# Patient Record
Sex: FEMALE | Race: WHITE | NOT HISPANIC OR LATINO | Employment: OTHER | URBAN - METROPOLITAN AREA
[De-identification: names, ages, dates, MRNs, and addresses within clinical notes are randomized per-mention and may not be internally consistent; named-entity substitution may affect disease eponyms.]

---

## 2019-01-31 ENCOUNTER — RECORDS - HEALTHEAST (OUTPATIENT)
Dept: LAB | Facility: CLINIC | Age: 64
End: 2019-01-31

## 2019-01-31 LAB
CHOLEST SERPL-MCNC: 216 MG/DL
FASTING STATUS PATIENT QL REPORTED: ABNORMAL
HDLC SERPL-MCNC: 61 MG/DL
LDLC SERPL CALC-MCNC: 138 MG/DL
TRIGL SERPL-MCNC: 85 MG/DL

## 2019-02-01 LAB — 25(OH)D3 SERPL-MCNC: 32.5 NG/ML (ref 30–80)

## 2019-02-19 ENCOUNTER — HOSPITAL ENCOUNTER (OUTPATIENT)
Dept: MAMMOGRAPHY | Facility: CLINIC | Age: 64
Discharge: HOME | End: 2019-02-19
Attending: FAMILY MEDICINE

## 2019-02-19 DIAGNOSIS — Z12.31 VISIT FOR SCREENING MAMMOGRAM: ICD-10-CM

## 2019-03-21 ENCOUNTER — RECORDS - HEALTHEAST (OUTPATIENT)
Dept: LAB | Facility: CLINIC | Age: 64
End: 2019-03-21

## 2019-03-21 LAB — TSH SERPL DL<=0.005 MIU/L-ACNC: 2.87 UIU/ML (ref 0.3–5)

## 2021-05-20 ENCOUNTER — RECORDS - HEALTHEAST (OUTPATIENT)
Dept: LAB | Facility: CLINIC | Age: 66
End: 2021-05-20

## 2021-05-20 LAB
ALBUMIN SERPL-MCNC: 4 G/DL (ref 3.5–5)
ALP SERPL-CCNC: 99 U/L (ref 45–120)
ALT SERPL W P-5'-P-CCNC: 21 U/L (ref 0–45)
ANION GAP SERPL CALCULATED.3IONS-SCNC: 11 MMOL/L (ref 5–18)
AST SERPL W P-5'-P-CCNC: 24 U/L (ref 0–40)
BILIRUB SERPL-MCNC: 0.7 MG/DL (ref 0–1)
BUN SERPL-MCNC: 18 MG/DL (ref 8–22)
CALCIUM SERPL-MCNC: 9 MG/DL (ref 8.5–10.5)
CHLORIDE BLD-SCNC: 104 MMOL/L (ref 98–107)
CHOLEST SERPL-MCNC: 185 MG/DL
CO2 SERPL-SCNC: 26 MMOL/L (ref 22–31)
CREAT SERPL-MCNC: 0.68 MG/DL (ref 0.6–1.1)
FASTING STATUS PATIENT QL REPORTED: NORMAL
GFR SERPL CREATININE-BSD FRML MDRD: >60 ML/MIN/1.73M2
GLUCOSE BLD-MCNC: 85 MG/DL (ref 70–125)
HDLC SERPL-MCNC: 52 MG/DL
LDLC SERPL CALC-MCNC: 117 MG/DL
POTASSIUM BLD-SCNC: 4.4 MMOL/L (ref 3.5–5)
PROT SERPL-MCNC: 6.3 G/DL (ref 6–8)
SODIUM SERPL-SCNC: 141 MMOL/L (ref 136–145)
TRIGL SERPL-MCNC: 82 MG/DL
TSH SERPL DL<=0.005 MIU/L-ACNC: 1.97 UIU/ML (ref 0.3–5)

## 2021-05-21 LAB — 25(OH)D3 SERPL-MCNC: 32.4 NG/ML (ref 30–80)

## 2021-05-27 ENCOUNTER — RECORDS - HEALTHEAST (OUTPATIENT)
Dept: ADMINISTRATIVE | Facility: CLINIC | Age: 66
End: 2021-05-27

## 2021-05-28 ENCOUNTER — RECORDS - HEALTHEAST (OUTPATIENT)
Dept: ADMINISTRATIVE | Facility: CLINIC | Age: 66
End: 2021-05-28

## 2021-06-02 ENCOUNTER — RECORDS - HEALTHEAST (OUTPATIENT)
Dept: ADMINISTRATIVE | Facility: CLINIC | Age: 66
End: 2021-06-02

## 2021-06-19 ENCOUNTER — HEALTH MAINTENANCE LETTER (OUTPATIENT)
Age: 66
End: 2021-06-19

## 2021-07-13 ENCOUNTER — RECORDS - HEALTHEAST (OUTPATIENT)
Dept: ADMINISTRATIVE | Facility: CLINIC | Age: 66
End: 2021-07-13

## 2021-07-21 ENCOUNTER — RECORDS - HEALTHEAST (OUTPATIENT)
Dept: ADMINISTRATIVE | Facility: CLINIC | Age: 66
End: 2021-07-21

## 2021-09-29 ENCOUNTER — LAB REQUISITION (OUTPATIENT)
Dept: LAB | Facility: CLINIC | Age: 66
End: 2021-09-29

## 2021-09-29 ENCOUNTER — LAB REQUISITION (OUTPATIENT)
Dept: LAB | Facility: CLINIC | Age: 66
End: 2021-09-29
Payer: COMMERCIAL

## 2021-09-29 DIAGNOSIS — A31.0 PULMONARY MYCOBACTERIAL INFECTION (H): ICD-10-CM

## 2021-09-29 DIAGNOSIS — Z03.818 ENCOUNTER FOR OBSERVATION FOR SUSPECTED EXPOSURE TO OTHER BIOLOGICAL AGENTS RULED OUT: ICD-10-CM

## 2021-09-29 LAB
C REACTIVE PROTEIN LHE: 1 MG/DL (ref 0–0.8)
ERYTHROCYTE [DISTWIDTH] IN BLOOD BY AUTOMATED COUNT: 12.4 % (ref 10–15)
ERYTHROCYTE [SEDIMENTATION RATE] IN BLOOD BY WESTERGREN METHOD: 2 MM/HR (ref 0–20)
HCT VFR BLD AUTO: 43 % (ref 35–47)
HGB BLD-MCNC: 13.8 G/DL (ref 11.7–15.7)
MCH RBC QN AUTO: 28.6 PG (ref 26.5–33)
MCHC RBC AUTO-ENTMCNC: 32.1 G/DL (ref 31.5–36.5)
MCV RBC AUTO: 89 FL (ref 78–100)
PLATELET # BLD AUTO: 264 10E3/UL (ref 150–450)
RBC # BLD AUTO: 4.82 10E6/UL (ref 3.8–5.2)
WBC # BLD AUTO: 5.5 10E3/UL (ref 4–11)

## 2021-09-29 PROCEDURE — 85027 COMPLETE CBC AUTOMATED: CPT | Performed by: FAMILY MEDICINE

## 2021-09-29 PROCEDURE — U0003 INFECTIOUS AGENT DETECTION BY NUCLEIC ACID (DNA OR RNA); SEVERE ACUTE RESPIRATORY SYNDROME CORONAVIRUS 2 (SARS-COV-2) (CORONAVIRUS DISEASE [COVID-19]), AMPLIFIED PROBE TECHNIQUE, MAKING USE OF HIGH THROUGHPUT TECHNOLOGIES AS DESCRIBED BY CMS-2020-01-R: HCPCS | Mod: ORL | Performed by: FAMILY MEDICINE

## 2021-09-29 PROCEDURE — 36415 COLL VENOUS BLD VENIPUNCTURE: CPT | Performed by: FAMILY MEDICINE

## 2021-09-29 PROCEDURE — 86141 C-REACTIVE PROTEIN HS: CPT | Performed by: FAMILY MEDICINE

## 2021-09-29 PROCEDURE — 85652 RBC SED RATE AUTOMATED: CPT | Performed by: FAMILY MEDICINE

## 2021-09-30 LAB — SARS-COV-2 RNA RESP QL NAA+PROBE: NEGATIVE

## 2021-10-13 ENCOUNTER — LAB REQUISITION (OUTPATIENT)
Dept: LAB | Facility: CLINIC | Age: 66
End: 2021-10-13
Payer: COMMERCIAL

## 2021-10-13 DIAGNOSIS — Z03.818 ENCOUNTER FOR OBSERVATION FOR SUSPECTED EXPOSURE TO OTHER BIOLOGICAL AGENTS RULED OUT: ICD-10-CM

## 2021-10-13 PROCEDURE — U0003 INFECTIOUS AGENT DETECTION BY NUCLEIC ACID (DNA OR RNA); SEVERE ACUTE RESPIRATORY SYNDROME CORONAVIRUS 2 (SARS-COV-2) (CORONAVIRUS DISEASE [COVID-19]), AMPLIFIED PROBE TECHNIQUE, MAKING USE OF HIGH THROUGHPUT TECHNOLOGIES AS DESCRIBED BY CMS-2020-01-R: HCPCS | Mod: ORL | Performed by: FAMILY MEDICINE

## 2021-10-14 LAB — SARS-COV-2 RNA RESP QL NAA+PROBE: NEGATIVE

## 2021-11-02 ENCOUNTER — ANCILLARY PROCEDURE (OUTPATIENT)
Dept: MAMMOGRAPHY | Facility: CLINIC | Age: 66
End: 2021-11-02
Attending: FAMILY MEDICINE
Payer: COMMERCIAL

## 2021-11-02 DIAGNOSIS — Z12.31 VISIT FOR SCREENING MAMMOGRAM: ICD-10-CM

## 2021-11-02 PROCEDURE — 77067 SCR MAMMO BI INCL CAD: CPT

## 2022-01-18 ENCOUNTER — LAB REQUISITION (OUTPATIENT)
Dept: LAB | Facility: CLINIC | Age: 67
End: 2022-01-18
Payer: COMMERCIAL

## 2022-01-18 DIAGNOSIS — Z03.818 ENCOUNTER FOR OBSERVATION FOR SUSPECTED EXPOSURE TO OTHER BIOLOGICAL AGENTS RULED OUT: ICD-10-CM

## 2022-01-18 PROCEDURE — U0003 INFECTIOUS AGENT DETECTION BY NUCLEIC ACID (DNA OR RNA); SEVERE ACUTE RESPIRATORY SYNDROME CORONAVIRUS 2 (SARS-COV-2) (CORONAVIRUS DISEASE [COVID-19]), AMPLIFIED PROBE TECHNIQUE, MAKING USE OF HIGH THROUGHPUT TECHNOLOGIES AS DESCRIBED BY CMS-2020-01-R: HCPCS | Performed by: NURSE PRACTITIONER

## 2022-01-19 LAB — SARS-COV-2 RNA RESP QL NAA+PROBE: POSITIVE

## 2022-02-03 ENCOUNTER — LAB REQUISITION (OUTPATIENT)
Dept: LAB | Facility: CLINIC | Age: 67
End: 2022-02-03

## 2022-02-03 DIAGNOSIS — J02.9 ACUTE PHARYNGITIS, UNSPECIFIED: ICD-10-CM

## 2022-02-03 PROCEDURE — 87081 CULTURE SCREEN ONLY: CPT | Performed by: NURSE PRACTITIONER

## 2022-02-05 LAB — BACTERIA SPEC CULT: NORMAL

## 2022-07-10 ENCOUNTER — HEALTH MAINTENANCE LETTER (OUTPATIENT)
Age: 67
End: 2022-07-10

## 2022-08-05 ENCOUNTER — TRANSFERRED RECORDS (OUTPATIENT)
Dept: HEALTH INFORMATION MANAGEMENT | Facility: CLINIC | Age: 67
End: 2022-08-05

## 2022-09-11 ENCOUNTER — HEALTH MAINTENANCE LETTER (OUTPATIENT)
Age: 67
End: 2022-09-11

## 2022-10-05 ENCOUNTER — LAB REQUISITION (OUTPATIENT)
Dept: LAB | Facility: CLINIC | Age: 67
End: 2022-10-05
Payer: COMMERCIAL

## 2022-10-05 DIAGNOSIS — J01.90 ACUTE SINUSITIS, UNSPECIFIED: ICD-10-CM

## 2022-10-05 PROCEDURE — U0003 INFECTIOUS AGENT DETECTION BY NUCLEIC ACID (DNA OR RNA); SEVERE ACUTE RESPIRATORY SYNDROME CORONAVIRUS 2 (SARS-COV-2) (CORONAVIRUS DISEASE [COVID-19]), AMPLIFIED PROBE TECHNIQUE, MAKING USE OF HIGH THROUGHPUT TECHNOLOGIES AS DESCRIBED BY CMS-2020-01-R: HCPCS | Mod: ORL | Performed by: PHYSICIAN ASSISTANT

## 2022-10-06 LAB — SARS-COV-2 RNA RESP QL NAA+PROBE: NEGATIVE

## 2023-04-10 ENCOUNTER — LAB REQUISITION (OUTPATIENT)
Dept: LAB | Facility: CLINIC | Age: 68
End: 2023-04-10

## 2023-04-10 DIAGNOSIS — Z13.220 ENCOUNTER FOR SCREENING FOR LIPOID DISORDERS: ICD-10-CM

## 2023-04-10 DIAGNOSIS — M85.80 OTHER SPECIFIED DISORDERS OF BONE DENSITY AND STRUCTURE, UNSPECIFIED SITE: ICD-10-CM

## 2023-04-10 LAB
ALBUMIN SERPL BCG-MCNC: 4.2 G/DL (ref 3.5–5.2)
ALP SERPL-CCNC: 96 U/L (ref 35–104)
ALT SERPL W P-5'-P-CCNC: 23 U/L (ref 10–35)
ANION GAP SERPL CALCULATED.3IONS-SCNC: 13 MMOL/L (ref 7–15)
AST SERPL W P-5'-P-CCNC: 28 U/L (ref 10–35)
BILIRUB SERPL-MCNC: 0.6 MG/DL
BUN SERPL-MCNC: 18.4 MG/DL (ref 8–23)
CALCIUM SERPL-MCNC: 9.2 MG/DL (ref 8.8–10.2)
CHLORIDE SERPL-SCNC: 108 MMOL/L (ref 98–107)
CHOLEST SERPL-MCNC: 172 MG/DL
CREAT SERPL-MCNC: 0.84 MG/DL (ref 0.51–0.95)
DEPRECATED HCO3 PLAS-SCNC: 23 MMOL/L (ref 22–29)
GFR SERPL CREATININE-BSD FRML MDRD: 74 ML/MIN/1.73M2
GLUCOSE SERPL-MCNC: 85 MG/DL (ref 70–99)
HDLC SERPL-MCNC: 53 MG/DL
LDLC SERPL CALC-MCNC: 106 MG/DL
NONHDLC SERPL-MCNC: 119 MG/DL
POTASSIUM SERPL-SCNC: 4.6 MMOL/L (ref 3.4–5.3)
PROT SERPL-MCNC: 6.1 G/DL (ref 6.4–8.3)
SODIUM SERPL-SCNC: 144 MMOL/L (ref 136–145)
TRIGL SERPL-MCNC: 65 MG/DL

## 2023-04-10 PROCEDURE — 82306 VITAMIN D 25 HYDROXY: CPT | Performed by: FAMILY MEDICINE

## 2023-04-10 PROCEDURE — 80061 LIPID PANEL: CPT | Performed by: FAMILY MEDICINE

## 2023-04-10 PROCEDURE — 80053 COMPREHEN METABOLIC PANEL: CPT | Performed by: FAMILY MEDICINE

## 2023-04-11 LAB — DEPRECATED CALCIDIOL+CALCIFEROL SERPL-MC: 40 UG/L (ref 20–75)

## 2023-07-17 ENCOUNTER — TRANSFERRED RECORDS (OUTPATIENT)
Dept: HEALTH INFORMATION MANAGEMENT | Facility: CLINIC | Age: 68
End: 2023-07-17
Payer: COMMERCIAL

## 2023-07-19 ENCOUNTER — MEDICAL CORRESPONDENCE (OUTPATIENT)
Dept: HEALTH INFORMATION MANAGEMENT | Facility: CLINIC | Age: 68
End: 2023-07-19
Payer: COMMERCIAL

## 2023-07-20 ENCOUNTER — TELEPHONE (OUTPATIENT)
Dept: PULMONOLOGY | Facility: CLINIC | Age: 68
End: 2023-07-20

## 2023-07-24 DIAGNOSIS — J47.9 BRONCHIECTASIS (H): Primary | ICD-10-CM

## 2023-07-29 ENCOUNTER — HEALTH MAINTENANCE LETTER (OUTPATIENT)
Age: 68
End: 2023-07-29

## 2023-08-18 ENCOUNTER — TELEPHONE (OUTPATIENT)
Dept: PULMONOLOGY | Facility: CLINIC | Age: 68
End: 2023-08-18
Payer: COMMERCIAL

## 2023-08-18 NOTE — TELEPHONE ENCOUNTER
Called pt back, we do have access to [place] which is the records she is requesting.    Leona Weiss LPN

## 2023-08-18 NOTE — TELEPHONE ENCOUNTER
[facility] Call Center    Phone Message    May a detailed message be left on voicemail: yes     Reason for Call: Other: Medical Records   Patient would like a call back from the team to discuss if they've received all the needed medical records before her appt with the provider. Please review and advise. Thank you.     Action Taken: Other: Pulm    Travel Screening: Not Applicable

## 2023-08-28 ENCOUNTER — TRANSFERRED RECORDS (OUTPATIENT)
Dept: HEALTH INFORMATION MANAGEMENT | Facility: CLINIC | Age: 68
End: 2023-08-28
Payer: COMMERCIAL

## 2023-09-19 ENCOUNTER — OFFICE VISIT (OUTPATIENT)
Dept: PULMONOLOGY | Facility: CLINIC | Age: 68
End: 2023-09-19
Payer: COMMERCIAL

## 2023-09-19 VITALS
BODY MASS INDEX: 24.59 KG/M2 | HEART RATE: 74 BPM | DIASTOLIC BLOOD PRESSURE: 60 MMHG | HEIGHT: 65 IN | SYSTOLIC BLOOD PRESSURE: 122 MMHG | OXYGEN SATURATION: 98 % | WEIGHT: 147.6 LBS

## 2023-09-19 DIAGNOSIS — A31.9 ATYPICAL MYCOBACTERIUM DISEASE: ICD-10-CM

## 2023-09-19 DIAGNOSIS — J47.9 BRONCHIECTASIS WITHOUT COMPLICATION (H): Primary | ICD-10-CM

## 2023-09-19 DIAGNOSIS — J47.9 BRONCHIECTASIS (H): ICD-10-CM

## 2023-09-19 DIAGNOSIS — K21.00 GASTROESOPHAGEAL REFLUX DISEASE WITH ESOPHAGITIS WITHOUT HEMORRHAGE: ICD-10-CM

## 2023-09-19 LAB
DLCOCOR-%PRED-PRE: 95 %
DLCOCOR-PRE: 18.62 ML/MIN/MMHG
DLCOUNC-%PRED-PRE: 95 %
DLCOUNC-PRE: 18.45 ML/MIN/MMHG
DLCOUNC-PRED: 19.42 ML/MIN/MMHG
ERV-%PRED-PRE: 85 %
ERV-PRE: 0.87 L
ERV-PRED: 1.02 L
EXPTIME-PRE: 9.44 SEC
FEF2575-%PRED-POST: 56 %
FEF2575-%PRED-PRE: 43 %
FEF2575-POST: 1.02 L/SEC
FEF2575-PRE: 0.78 L/SEC
FEF2575-PRED: 1.79 L/SEC
FEFMAX-%PRED-PRE: 66 %
FEFMAX-PRE: 3.85 L/SEC
FEFMAX-PRED: 5.75 L/SEC
FEV1-%PRED-PRE: 76 %
FEV1-PRE: 1.62 L
FEV1FEV6-PRE: 61 %
FEV1FEV6-PRED: 79 %
FEV1FVC-PRE: 59 %
FEV1FVC-PRED: 79 %
FEV1SVC-PRE: 61 %
FEV1SVC-PRED: 69 %
FIFMAX-PRE: 4.89 L/SEC
FRCPLETH-%PRED-PRE: 131 %
FRCPLETH-PRE: 3.82 L
FRCPLETH-PRED: 2.9 L
FVC-%PRED-PRE: 102 %
FVC-PRE: 2.76 L
FVC-PRED: 2.69 L
HGB BLD-MCNC: 13.1 G/DL
IC-%PRED-PRE: 86 %
IC-PRE: 1.77 L
IC-PRED: 2.04 L
RVPLETH-%PRED-PRE: 144 %
RVPLETH-PRE: 2.93 L
RVPLETH-PRED: 2.03 L
TLCPLETH-%PRED-PRE: 108 %
TLCPLETH-PRE: 5.59 L
TLCPLETH-PRED: 5.15 L
VA-%PRED-PRE: 87 %
VA-PRE: 4.12 L
VC-%PRED-PRE: 86 %
VC-PRE: 2.67 L
VC-PRED: 3.07 L

## 2023-09-19 PROCEDURE — 94060 EVALUATION OF WHEEZING: CPT | Performed by: INTERNAL MEDICINE

## 2023-09-19 PROCEDURE — 94726 PLETHYSMOGRAPHY LUNG VOLUMES: CPT | Performed by: INTERNAL MEDICINE

## 2023-09-19 PROCEDURE — 85018 HEMOGLOBIN: CPT | Performed by: INTERNAL MEDICINE

## 2023-09-19 PROCEDURE — 99204 OFFICE O/P NEW MOD 45 MIN: CPT | Mod: 25 | Performed by: INTERNAL MEDICINE

## 2023-09-19 PROCEDURE — 94729 DIFFUSING CAPACITY: CPT | Performed by: INTERNAL MEDICINE

## 2023-09-19 RX ORDER — WARFARIN SODIUM 5 MG/1
5 TABLET ORAL DAILY
COMMUNITY

## 2023-09-19 RX ORDER — ALBUTEROL SULFATE 90 UG/1
2 AEROSOL, METERED RESPIRATORY (INHALATION) EVERY 6 HOURS PRN
COMMUNITY

## 2023-09-19 NOTE — PATIENT INSTRUCTIONS
Continue albuterol HFA as needed  Saline nebs daily and increase to twice a day if needed  Avoid eating close to bedtime at least three hours   Raise the head of the bed  Famotidine 40 mg daily for acid reflux a  Follow up in 6 months

## 2023-09-19 NOTE — PROGRESS NOTES
PULMONARY OUTPATIENT CONSULT NOTE        Assessment:      Bronchiectasis  Normal immunoglobulins, negative RF, negative CCP, normal HILDA.  Continue pulmonary toiletting.   Hypertonic saline nebs daily and increase the frequency as needed.   Continue treatment of GERD.   S/p treatment of MAC  Macrolide susceptible, status post treatment 2 years through 2020   Mild obstructive pulmonary disease  No significant respiratory symptoms. Albuterol HFA as needed  GERD  Abnormal Bravo probe 15 April 2022.  Famotidine 40 mg daily      Plan:      Continue albuterol HFA as needed  Saline nebs daily and increase to twice a day if needed  Avoid eating close to bedtime at least three hours   Raise the head of the bed  Famotidine 40 mg daily for acid reflux   Follow up in 6 months      Dc Arredondo  Pulmonary / Critical Care  September 19, 2023        CC:     Chief Complaint   Patient presents with    Consult     Bronchiectasis       HPI:         Joan Estevez is a 68 year old female who presents for evaluation of bronchiectasis.   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  Patient follows with Dr. Alejandre, pulmonary at [facility] in a yearly bases.   Patient would to establish pulmonary care in this clinic if patient is not able to get an appointment at [facility].   Denies exertional dyspnea , no limitations with activities.   Mild dry/productive cough of clear sputum mostly in AM, denies wheezes.   No hemoptysis.   Denies fever, chills or night sweats.   Denies postnasal drip, no headaches, no sinus tenderness.   Uses saline 3% nebs.   Denies chest pain, orthopnea, PND, or swelling of LEs.  No sleeping problems.   Denies acid reflux, takes famotidine as needed.   Denies tobacco use.         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          Medications:     albuterol (PROAIR HFA/PROVENTIL HFA/VENTOLIN HFA) 108 (90 Base) MCG/ACT inhaler, Inhale 2 puffs into the lungs daily  multivitamin, therapeutic with minerals (MULTI-VITAMIN) TABS, Take 1 tablet by mouth daily  Sodium Chloride 3.5 % NEBU, Inhale 1 Nebule into the lungs as needed  warfarin ANTICOAGULANT (COUMADIN) 5 MG tablet, Take 5 mg by mouth daily Pt takes 6 days per week  Alendronate Sodium 70 MG TBEF, Take 70 mg by mouth every 7 days  aspirin 81 MG chewable tablet, Take 81 mg by mouth daily  calcium citrate (CALCITRATE) 950 MG tablet, Take 1 tablet by mouth daily  CELLCEPT 500 MG PO TABLET, Take 1,000 mg by mouth 2 times daily  HYDROcodone-acetaminophen (NORCO)  MG per tablet, Take 1 tablet by mouth every 6 hours as needed for moderate to severe pain  PANTOPRAZOLE SODIUM PO, Take 40 mg by mouth  RANITIDINE HCL PO, Take 300 mg by mouth  SOTALOL HCL PO, Take 120 mg by mouth  TRAMADOL HCL PO, Take 50 mg by mouth    No current facility-administered medications on file prior to visit.       Social History :     Social History     Socioeconomic History    Marital status:      Spouse name: Not on file    Number of children: Not on file    Years of education: Not on file    Highest education level: Not on file   Occupational History    Not on file   Tobacco Use    Smoking status: Never    Smokeless tobacco: Never   Vaping Use    Vaping Use: Never used   Substance and Sexual Activity    Alcohol use: No    Drug use: No    Sexual activity: Not on file   Other Topics Concern    Parent/sibling w/ CABG, MI or angioplasty before 65F 55M? Not Asked   Social History Narrative    Not on file     Social Determinants of Health     Financial Resource Strain: Not on file   Food Insecurity: Not on file  "  Transportation Needs: Not on file   Physical Activity: Not on file   Stress: Not on file   Social Connections: Not on file   Intimate Partner Violence: Not on file   Housing Stability: Not on file          Family History :     Family History   Problem Relation Age of Onset    Cancer Mother     Breast Cancer Paternal Aunt         early 50    Hereditary Breast and Ovarian Cancer Syndrome No family hx of     Colon Cancer No family hx of     Endometrial Cancer No family hx of     Ovarian Cancer No family hx of        Review of Systems  A 12 point comprehensive review of systems was negative except as noted.        Objective:     /60 (BP Location: Right arm, Patient Position: Chair, Cuff Size: Adult Regular)   Pulse 74   Ht 1.651 m (5' 5\")   Wt 67 kg (147 lb 9.6 oz)   SpO2 98%   BMI 24.56 kg/m        Gen: awake, alert, no distress  HEENT: pink conjunctiva, moist mucosa, Mallampati II/IV  Neck: no thyromegaly, masses or JVD  Lungs: clear  CV: regular, no murmurs or gallops appreciated  Abdomen: soft, NT, BS wnl  Ext: no edema  Neuro: CN II-XII intact, non focal      Diagnostic tests:         PFTs:          IMAGES:     BRAVO STUDY 4/15/2022  OVERALL IMPRESSION:   Mildly increased esophageal  acid exposure time primarily in the upright position   No correlation of reflux with the symptoms of  belching  or  reflux .       CT CHEST WITHOUT IV CONTRAST  COMPARISON: Outside chest CT 10/7/2021 and 3/12/2021  FINDINGS:   There are findings of airway wall thickening with mucous plugging and mild bronchiolectasis. There is associated peripheral tree-in-bud opacities. Findings are greatest in the lingula and right   middle lobe. Compared to prior exams there is slight increased airspace disease at the left upper lobe as seen on series 3 image 88 through 171. Other areas of involvement which include upper and lower lungs are stable.  Stable bilateral symmetric apical scarring. No lymphadenopathy.   Possible partial " imaging of a 10 mm low dense lesion/cyst in the head/neck portion of the pancreas.   Finding could represent an IPMN. Could consider further evaluation with ultrasound imaging.   Otherwise noncontrast CT images through the upper abdomen are negative. Mild vascular calcification.   IMPRESSION   1. 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 There are mild new findings in the left upper lobe. Findings within the mid and lower lungs are otherwise stable.   2. Potential 10 mm cyst in the head and neck region of the pancreas. Consider ultrasound for further evaluation.

## 2023-11-08 ENCOUNTER — TRANSFERRED RECORDS (OUTPATIENT)
Dept: HEALTH INFORMATION MANAGEMENT | Facility: CLINIC | Age: 68
End: 2023-11-08
Payer: COMMERCIAL

## 2023-11-10 ENCOUNTER — TELEPHONE (OUTPATIENT)
Dept: PULMONOLOGY | Facility: CLINIC | Age: 68
End: 2023-11-10
Payer: COMMERCIAL

## 2023-11-10 NOTE — TELEPHONE ENCOUNTER
Health Call Center    Phone Message    May a detailed message be left on voicemail: yes     Reason for Call: Symptoms or Concerns     Current symptom or concern: Pt is experiencing pain in chest. Did see PCP at [facility] and had a chest CT. Pt was told she has some pneumonia.     Symptoms have been present for:  3-4 weeks    Are there any new or worsening symptoms? Yes: Pt states the medications she was prescribed by PCP are not helping with symptoms.     Action Taken: Message routed to:  Other: [facility] Pulm     Travel Screening: Not Applicable

## 2023-11-15 ENCOUNTER — TRANSCRIBE ORDERS (OUTPATIENT)
Dept: OTHER | Age: 68
End: 2023-11-15

## 2023-11-15 ENCOUNTER — MEDICAL CORRESPONDENCE (OUTPATIENT)
Dept: HEALTH INFORMATION MANAGEMENT | Facility: CLINIC | Age: 68
End: 2023-11-15
Payer: COMMERCIAL

## 2023-11-15 DIAGNOSIS — J18.9 PULMONARY INFECTION: Primary | ICD-10-CM

## 2023-11-16 ENCOUNTER — OFFICE VISIT (OUTPATIENT)
Dept: PULMONOLOGY | Facility: CLINIC | Age: 68
End: 2023-11-16
Payer: COMMERCIAL

## 2023-11-16 ENCOUNTER — HOSPITAL ENCOUNTER (OUTPATIENT)
Dept: CT IMAGING | Facility: HOSPITAL | Age: 68
Discharge: HOME | End: 2023-11-16
Attending: NURSE PRACTITIONER | Admitting: NURSE PRACTITIONER
Payer: COMMERCIAL

## 2023-11-16 VITALS
BODY MASS INDEX: 24.13 KG/M2 | DIASTOLIC BLOOD PRESSURE: 62 MMHG | SYSTOLIC BLOOD PRESSURE: 118 MMHG | WEIGHT: 145 LBS | OXYGEN SATURATION: 96 % | HEART RATE: 88 BPM | TEMPERATURE: 97.3 F

## 2023-11-16 DIAGNOSIS — J47.9 BRONCHIECTASIS WITHOUT COMPLICATION (H): Primary | ICD-10-CM

## 2023-11-16 DIAGNOSIS — A31.0 MYCOBACTERIUM AVIUM COMPLEX (H): ICD-10-CM

## 2023-11-16 DIAGNOSIS — J47.9 BRONCHIECTASIS WITHOUT COMPLICATION (H): ICD-10-CM

## 2023-11-16 PROCEDURE — 99214 OFFICE O/P EST MOD 30 MIN: CPT | Performed by: NURSE PRACTITIONER

## 2023-11-16 PROCEDURE — 71250 CT THORAX DX C-: CPT

## 2023-11-16 RX ORDER — IPRATROPIUM BROMIDE AND ALBUTEROL SULFATE 2.5; .5 MG/3ML; MG/3ML
1 SOLUTION RESPIRATORY (INHALATION) EVERY 6 HOURS PRN
Qty: 180 ML | Refills: 3 | Status: SHIPPED | OUTPATIENT
Start: 2023-11-16 | End: 2024-04-15

## 2023-11-16 NOTE — TELEPHONE ENCOUNTER
"Called and spoke with patient.  Patient has hx of MAC pneumonia and feels her symptoms are the same as when she had her pneumonia previously.  Is coughing, but feels congestion in her chest is \"stuck in there\".  She is using her 3% saline nebs bid at home.  Completed 5 days of azithromycin.  Feels the \"crackling\" in her lungs may be some better, but really does not feel any better.  Very fatigued.  No fevers, but does have periods where she feels hot and then cold.   Not really coughing up anything at this time, but feels congested.  Cxr from [facility] obtained.  Dr. Rhodes reviewed in clinic and patient scheduled with NP this morning for office visit to evaluate.       "

## 2023-11-16 NOTE — TELEPHONE ENCOUNTER
Spoke to patient this morning while scheduling her referral from DR Mccoy, she is feeling worse, oxygen levels are lower and she is feeling fatigued, please assist

## 2023-11-16 NOTE — PATIENT INSTRUCTIONS
- we will get a sputum sample in the RT lab.   - we will get a CT scan of your chest to see what your airways look like    - for now, start the Duonebs 2-4 times daily. Do this BEFORE your 3% nebulizer treatment instead of the albuterol inhaler for now.      If you have worsening symptoms, questions, or need to speak with the nurse please call [phone].

## 2023-11-16 NOTE — PROGRESS NOTES
PULMONARY OUTPATIENT CLINIC NOTE   November 16, 2023     Assessment:      Bronchiectasis  Normal immunoglobulins, negative RF, negative CCP, normal HILDA.  Continue pulmonary toiletting.   Hypertonic saline nebs daily and increase the frequency as needed.   Continue treatment of GERD.   S/p treatment of MAC: ? Reoccurrence   Macrolide susceptible, status post treatment 2 years through 2020   Induced sputum with RT, she is unable to produce sputum currently  Mild obstructive pulmonary disease  No significant respiratory symptoms. Albuterol HFA as needed  GERD  Abnormal Bravo probe 15 April 2022.  Famotidine 40 mg daily      Plan:      Trial Duonebs prior to 3% neb instead of HFA, prn 2-4 times daily   Induced sputum with gram stain, fungal, and AFB culture  Hi res CT scan to assess for MAC infection   Continue albuterol HFA as needed  Saline nebs daily and increase to twice a day if needed. Continue BID use until symptoms improve.   Avoid eating close to bedtime at least three hours   Raise the head of the bed  Famotidine 40 mg daily for acid reflux   Follow up with Dr. Scott in 4-6 weeks. Call the clinic if worsening symptoms prior to this.      Melinda Barrett, CNP  Pulmonary Medicine  [facility]   [phone]      CC:     Chief Complaint   Patient presents with    Follow Up     Chest congestion x 4 weeks       HPI:      Joan Estevez is a 68 year old female who presents with acute worsening of symptoms x 3 weeks.   Symptoms started 10/23, increased to BID 3% nebs, she has been doing this once daily for maintenance. Initially noticed increased sputum that was dark colored, fevers, increased cough, and sore throat.   Given Augmentin, did not tolerate after a few days. Changed to azithromycin which she did complete, finished a few days ago.   CXR at [facility] done, images not available today. Showed opacity in the RLL, consistent with pneumonia. Trace bilateral pleural effusions.   Still having fatigue  "and cough. Feels the \"crackle\" in her lungs has resolved but still feels there is congestion \"stuck\"  in her chest. She has difficulty clearing any secretions and feels like she can't clear anything out of her airways. She has been using nebs BID with flutter valve. Has been using the albuterol HFA prior to nebulizer use.   Denies hemoptysis.   She is very nervous this is MAC infection. She has contacted her ID provider but has not heard back yet.     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  Patient follows with Dr. Alejandre, pulmonary at [facility] on a yearly basis, next appointment is in a few weeks.   Denies exertional dyspnea , no limitations with activities.   Denies postnasal drip, no headaches, no sinus tenderness.   Denies chest pain, orthopnea, PND, or swelling of LEs.  No sleeping problems.   Denies acid reflux, takes famotidine as needed.   Denies tobacco use.      Past Medical History :     Past Medical History:   Diagnosis Date    Atrial fibrillation (H)     had cardioversion, has been in afib twice    Cancer (H)     squamous cell leg, excised    Undifferentiated connective tissue disease (H24)     targets the lungs      Medications:     albuterol (PROAIR HFA/PROVENTIL HFA/VENTOLIN HFA) 108 (90 Base) MCG/ACT inhaler, Inhale 2 puffs into the lungs every 6 hours as needed  multivitamin, therapeutic with minerals (MULTI-VITAMIN) TABS, Take 1 tablet by mouth daily  Sodium Chloride 3.5 % NEBU, Inhale 1 Nebule into the lungs as needed  warfarin ANTICOAGULANT (COUMADIN) 5 MG tablet, Take 5 mg by mouth daily Pt takes 6 days per week    No current facility-administered medications on file prior to visit.    Social History :     Social History     Socioeconomic History    Marital status:      Spouse name: Not on file    Number of children: Not on file    " Years of education: Not on file    Highest education level: Not on file   Occupational History    Not on file   Tobacco Use    Smoking status: Never    Smokeless tobacco: Never   Vaping Use    Vaping Use: Never used   Substance and Sexual Activity    Alcohol use: No    Drug use: No    Sexual activity: Not on file   Other Topics Concern    Parent/sibling w/ CABG, MI or angioplasty before 65F 55M? Not Asked   Social History Narrative    Not on file     Social Determinants of Health     Financial Resource Strain: Not on file   Food Insecurity: Not on file   Transportation Needs: Not on file   Physical Activity: Not on file   Stress: Not on file   Social Connections: Not on file   Interpersonal Safety: Not on file   Housing Stability: Not on file        Family History :     Family History   Problem Relation Age of Onset    Cancer Mother     Breast Cancer Paternal Aunt         early 50    Hereditary Breast and Ovarian Cancer Syndrome No family hx of     Colon Cancer No family hx of     Endometrial Cancer No family hx of     Ovarian Cancer No family hx of      Review of Systems  A 10 point comprehensive review of systems was negative except as noted.        Objective:     /62 (BP Location: Left arm, Patient Position: Chair, Cuff Size: Adult Regular)   Pulse 88   Temp 97.3  F (36.3  C) (Oral)   Wt 65.8 kg (145 lb)   SpO2 96%   BMI 24.13 kg/m      Physical Exam  Constitutional:       General: She is not in acute distress.     Appearance: She is not ill-appearing or diaphoretic.   Cardiovascular:      Rate and Rhythm: Normal rate and regular rhythm.      Pulses: Normal pulses.      Heart sounds: Normal heart sounds.   Pulmonary:      Effort: Pulmonary effort is normal. No respiratory distress.      Breath sounds: No stridor. Examination of the right-lower field reveals wheezing. Wheezing (faint, expiratory) present. No rhonchi.   Musculoskeletal:      Right lower leg: No edema.      Left lower leg: No edema.    Skin:     General: Skin is warm and dry.      Findings: No rash.   Neurological:      Mental Status: She is alert.   Psychiatric:         Behavior: Behavior normal.       Diagnostic tests:         PFTs:          IMAGES:     BRAVO STUDY 4/15/2022  OVERALL IMPRESSION:   Mildly increased esophageal  acid exposure time primarily in the upright position   No correlation of reflux with the symptoms of  belching  or  reflux .       CT CHEST WITHOUT IV CONTRAST, 03/2022  COMPARISON: Outside chest CT 10/7/2021 and 3/12/2021  FINDINGS:   There are findings of airway wall thickening with mucous plugging and mild bronchiolectasis. There is associated peripheral tree-in-bud opacities. Findings are greatest in the lingula and right   middle lobe. Compared to prior exams there is slight increased airspace disease at the left upper lobe as seen on series 3 image 88 through 171. Other areas of involvement which include upper and lower lungs are stable.  Stable bilateral symmetric apical scarring. No lymphadenopathy.   Possible partial imaging of a 10 mm low dense lesion/cyst in the head/neck portion of the pancreas.   Finding could represent an IPMN. Could consider further evaluation with ultrasound imaging.   Otherwise noncontrast CT images through the upper abdomen are negative. Mild vascular calcification.   IMPRESSION   1. 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 There are mild new findings in the left upper lobe. Findings within the mid and lower lungs are otherwise stable.   2. Potential 10 mm cyst in the head and neck region of the pancreas. Consider ultrasound for further evaluation.

## 2023-11-17 ENCOUNTER — TELEPHONE (OUTPATIENT)
Dept: PULMONOLOGY | Facility: CLINIC | Age: 68
End: 2023-11-17
Payer: COMMERCIAL

## 2023-11-17 DIAGNOSIS — A31.9 ATYPICAL MYCOBACTERIUM DISEASE: Primary | ICD-10-CM

## 2023-11-17 NOTE — TELEPHONE ENCOUNTER
"Phone call from patient.  [facility] and is concerned about \" Findings are typical of nontuberculous atypical mycobacterial/MAC infection. \"  She has had MAC pneumonia before and is wanting to get treatment started asap.  CT was done yesterday.      Has not been able to reach her previous ID provider and is being told that she is not taking new patients, so says she needs to do an e-visit with her PCP and try to get back in with her.      [facility] and will also fax report of her CT over to Dr. Knight at  to see if we can speed the process up.      Will also review CT with  on Monday and discuss possible treatment options.    "

## 2023-11-20 ENCOUNTER — OFFICE VISIT (OUTPATIENT)
Dept: PULMONOLOGY | Facility: CLINIC | Age: 68
End: 2023-11-20
Payer: COMMERCIAL

## 2023-11-20 ENCOUNTER — TELEPHONE (OUTPATIENT)
Dept: PULMONOLOGY | Facility: CLINIC | Age: 68
End: 2023-11-20

## 2023-11-20 DIAGNOSIS — A31.0 MYCOBACTERIUM AVIUM COMPLEX (H): ICD-10-CM

## 2023-11-20 DIAGNOSIS — J47.9 BRONCHIECTASIS WITHOUT COMPLICATION (H): ICD-10-CM

## 2023-11-20 LAB
BACTERIA SPT CULT: NORMAL
GRAM STAIN RESULT: NORMAL

## 2023-11-20 PROCEDURE — 99000 SPECIMEN HANDLING OFFICE-LAB: CPT | Performed by: PATHOLOGY

## 2023-11-20 PROCEDURE — 87205 SMEAR GRAM STAIN: CPT

## 2023-11-20 PROCEDURE — 87116 MYCOBACTERIA CULTURE: CPT | Mod: 90 | Performed by: PATHOLOGY

## 2023-11-20 PROCEDURE — 87102 FUNGUS ISOLATION CULTURE: CPT

## 2023-11-20 PROCEDURE — 87070 CULTURE OTHR SPECIMN AEROBIC: CPT

## 2023-11-20 PROCEDURE — 94640 AIRWAY INHALATION TREATMENT: CPT | Performed by: INTERNAL MEDICINE

## 2023-11-20 PROCEDURE — 87206 SMEAR FLUORESCENT/ACID STAI: CPT | Mod: 90 | Performed by: PATHOLOGY

## 2023-11-20 NOTE — PROGRESS NOTES
Joan Estevez    This patient comes into clinic  today at the request of Dr. Barrett for a sputum induction.    Patient was first given 2.5 mg of Albuterol nebulizer, after which 5mL 10% hypertonic saline was administered via nebulizer.      Pre-treatment: SpO2= 96% HR= 87 BBS= Clear  Post-treatment: SpO2= 98%  HR= 92 BBS= Clear    Patient was successful in producing a sample.    No adverse side effects noted by the patient.    This service provided today by Dr. Fraire, who was available if needed.

## 2023-11-20 NOTE — TELEPHONE ENCOUNTER
Attempted to contact Joan BENNETT asking her to call and let nursing staff when a good time to call her back would be.     Melinda Barrett, CNP  Pulmonary Medicine  [facility]   [phone]

## 2023-11-21 ENCOUNTER — OFFICE VISIT (OUTPATIENT)
Dept: INFECTIOUS DISEASES | Facility: CLINIC | Age: 68
End: 2023-11-21
Attending: NURSE PRACTITIONER
Payer: COMMERCIAL

## 2023-11-21 ENCOUNTER — TELEPHONE (OUTPATIENT)
Dept: PULMONOLOGY | Facility: CLINIC | Age: 68
End: 2023-11-21

## 2023-11-21 ENCOUNTER — PREP FOR PROCEDURE (OUTPATIENT)
Dept: PULMONOLOGY | Facility: CLINIC | Age: 68
End: 2023-11-21

## 2023-11-21 VITALS
SYSTOLIC BLOOD PRESSURE: 120 MMHG | OXYGEN SATURATION: 95 % | BODY MASS INDEX: 24.36 KG/M2 | TEMPERATURE: 97.5 F | DIASTOLIC BLOOD PRESSURE: 66 MMHG | HEART RATE: 77 BPM | WEIGHT: 146.4 LBS

## 2023-11-21 DIAGNOSIS — A31.0 MYCOBACTERIUM AVIUM COMPLEX (H): Primary | ICD-10-CM

## 2023-11-21 DIAGNOSIS — A31.9 ATYPICAL MYCOBACTERIUM DISEASE: Primary | ICD-10-CM

## 2023-11-21 PROCEDURE — 99204 OFFICE O/P NEW MOD 45 MIN: CPT | Performed by: STUDENT IN AN ORGANIZED HEALTH CARE EDUCATION/TRAINING PROGRAM

## 2023-11-21 RX ORDER — LIDOCAINE 40 MG/G
CREAM TOPICAL
Status: CANCELLED | OUTPATIENT
Start: 2023-11-21

## 2023-11-21 NOTE — TELEPHONE ENCOUNTER
Spoke with Joan regarding her CT results and further plan. Dr. Scott reviewed the images and her chart in clinic and suggested a bronchoscopy if the sputum sample was contaminated or inconclusive, which it was. She was seen by ID this AM and they are in agreement that a bronchoscopy to obtain a sample is the next step. She has no further questions and is agreeable to this. She would like to proceed with the next available bronchoscopy appointment.     Melinda Barrett, CNP  Pulmonary Medicine  [facility]   [phone]

## 2023-11-21 NOTE — TELEPHONE ENCOUNTER
Called patient to go over bronchoscopy education. Informed patient that procedure is scheduled for 11/22/23 at 10am at [facility].  Instructed to arrive at 830am.  Discussed that an IV will be placed and IV sedation will be given. Instructed to have nothing to eat after midnight. Ok to have clear liquids until 630am.    Medication instructions: hold meds in the morning  Stop Aspirin, Ibuprofen, NSAIDS, coxibs (ie:celebrex)date:  n/a  Stop blood thinner date: n/a    Patient understands that they will need a ride home and someone to stay with them after the procedure.  Patient had no further questions and is agreeable to procedure.  Phone number given for questions.    Alena Dominguez RN  Pulmonary Specialty Procedures  [facility]  [phone]

## 2023-11-21 NOTE — PROGRESS NOTES
[facility] new patient note.    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  She is in ID clinic for likely recurrent infection.  She complains of nonproductive cough similar to previous episodes in the past.  She constantly feels tired although she does not feel short of breath.  She denies fever but endorses hot and cold spells.  She report night sweats.  Her previous SOFYA was macrolide susceptible.  She was never a smoker although she has a 21-year secondhand smoking from both of her parents being smokers.  She has 2 siblings with no respiratory issues.  No history of recurrent infections.  The previous treatment regimen gave her some diarrhea but it was bearable.    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       Past Surgical History:  Past Surgical History:   Procedure Laterality Date    CARDIAC SURGERY      cardioversion    COLONOSCOPY      GYN SURGERY      hyster abdomen    HYSTERECTOMY      OOPHORECTOMY      ORTHOPEDIC SURGERY      arthroscopy left    ORTHOPEDIC SURGERY      right foot neuroma       Social History:  Social History     Socioeconomic History    Marital status:      Spouse name: Not on file    Number of children: Not on file    Years of education: Not on file    Highest education level: Not on file   Occupational History    Not on file   Tobacco Use    Smoking status: Never    Smokeless tobacco: Never   Vaping Use    Vaping  Use: Never used   Substance and Sexual Activity    Alcohol use: No    Drug use: No    Sexual activity: Not on file   Other Topics Concern    Parent/sibling w/ CABG, MI or angioplasty before 65F 55M? Not Asked   Social History Narrative    Not on file     Social Determinants of Health     Financial Resource Strain: Not on file   Food Insecurity: Not on file   Transportation Needs: Not on file   Physical Activity: Not on file   Stress: Not on file   Social Connections: Not on file   Interpersonal Safety: Not on file   Housing Stability: Not on file       Family Medical History:  Family History   Problem Relation Age of Onset    Cancer Mother     Breast Cancer Paternal Aunt         early 50    Hereditary Breast and Ovarian Cancer Syndrome No family hx of     Colon Cancer No family hx of     Endometrial Cancer No family hx of     Ovarian Cancer No family hx of        Allergies:     Allergies   Allergen Reactions    Sulfa Antibiotics Rash       Medications:  Current Outpatient Medications   Medication Sig Dispense Refill    albuterol (PROAIR HFA/PROVENTIL HFA/VENTOLIN HFA) 108 (90 Base) MCG/ACT inhaler Inhale 2 puffs into the lungs every 6 hours as needed      ipratropium - albuterol 0.5 mg/2.5 mg/3 mL (DUONEB) 0.5-2.5 (3) MG/3ML neb solution Take 1 vial (3 mLs) by nebulization every 6 hours as needed for shortness of breath, wheezing or cough 180 mL 3    multivitamin, therapeutic with minerals (MULTI-VITAMIN) TABS Take 1 tablet by mouth daily      Sodium Chloride 3.5 % NEBU Inhale 1 Nebule into the lungs as needed      warfarin ANTICOAGULANT (COUMADIN) 5 MG tablet Take 5 mg by mouth daily Pt takes 6 days per week         Immunizations:  Immunization History   Administered Date(s) Administered    COVID-19 12+ (2023-24) (Pfizer) 09/21/2023    COVID-19 Bivalent 12+ (Pfizer) 10/24/2022    COVID-19 Monovalent 18+ (Moderna) 03/11/2021, 11/01/2021, 03/30/2022    Flu, Unspecified 12/03/2015       ==================    Review of  System:  12 points review of system is negative except for findings in the HPI.    Exam  VS: /66   Pulse 77   Temp 97.5  F (36.4  C) (Oral)   Wt 66.4 kg (146 lb 6.4 oz)   SpO2 95%   BMI 24.36 kg/m      Gen: Pleasant in no acute distress.  HEENT: NCAT. EOMI.  Neck: No LAD.  Lungs: Clear to auscultation bilaterally with no crackles or wheezes.   Card: RRR. No RMG. Peripheral pulses present and symmetrical. No edema.   Abd: Soft NT ND. No hepatomegaly or splenomegaly.  Ext: No edema  Lymph: No cervical or supraclavicular adenopathy.  Skin: No rash  Neuro: Alert and oriented to place time and person. Cranial nerves grossly intact.     Labs:  Reviewed    Imaging:  Reviewed    Assessment:  Joan Estevez is a 68 year old old female with bronchiectasis of unclear etiology.  This was complicated with SOFYA, macrolide susceptible, treated with azithromycin plus ethambutol plus rifabutin through 2020.  Recurrent symptoms with CT scan as above.  She had RT induced sputum on 11/20/2023.  Smear and cultures in process.      Recommendations:  Follow-up pending AFB smear from 11/20/2023.  If AFB smear positive, will start on triple drug therapy with azithromycin plus ethambutol plus rifabutin.  Planning to do sequential therapy with azithromycin plus rifabutin first before ethambutol is introduced.  If AFB smear is negative, will connect with Dr. Scott for bronchoscopy.  Follow-up pending AFB cultures from 11/20/2023.  We will discuss with Dr. Scott on testing for alpha-1 antitrypsin deficiency, CF given the absence of any clear explanation to why she has bronchiectasis.  Follow-up on December 19, 2023.      Elvin Pimentel MD  [facility]  [facility]  Office Telephone [phone].  Fax [phone]  [facility] paging

## 2023-11-22 ENCOUNTER — HOSPITAL ENCOUNTER (OUTPATIENT)
Dept: CARDIOLOGY | Facility: HOSPITAL | Age: 68
Discharge: HOME | End: 2023-11-22
Attending: NURSE PRACTITIONER | Admitting: NURSE PRACTITIONER
Payer: COMMERCIAL

## 2023-11-22 VITALS
HEIGHT: 66 IN | DIASTOLIC BLOOD PRESSURE: 51 MMHG | RESPIRATION RATE: 12 BRPM | HEART RATE: 65 BPM | SYSTOLIC BLOOD PRESSURE: 96 MMHG | OXYGEN SATURATION: 97 % | BODY MASS INDEX: 23.37 KG/M2 | TEMPERATURE: 97.7 F | WEIGHT: 145.4 LBS

## 2023-11-22 DIAGNOSIS — A31.0 MYCOBACTERIUM AVIUM COMPLEX (H): ICD-10-CM

## 2023-11-22 DIAGNOSIS — J47.1 BRONCHIECTASIS WITH ACUTE EXACERBATION (H): Primary | ICD-10-CM

## 2023-11-22 LAB
APPEARANCE FLD: ABNORMAL
CELL COUNT BODY FLUID SOURCE: ABNORMAL
COLOR FLD: ABNORMAL
EOSINOPHIL NFR FLD MANUAL: 10 %
GRAM STAIN RESULT: NORMAL
GRAM STAIN RESULT: NORMAL
KOH PREPARATION: NORMAL
KOH PREPARATION: NORMAL
LYMPHOCYTES NFR FLD MANUAL: 4 %
MONOS+MACROS NFR FLD MANUAL: 2 %
NEUTS BAND NFR FLD MANUAL: 84 %
RBC # FLD: 2096 /UL
WBC # FLD AUTO: 332 /UL

## 2023-11-22 PROCEDURE — 87106 FUNGI IDENTIFICATION YEAST: CPT | Performed by: INTERNAL MEDICINE

## 2023-11-22 PROCEDURE — 89051 BODY FLUID CELL COUNT: CPT | Performed by: INTERNAL MEDICINE

## 2023-11-22 PROCEDURE — 87205 SMEAR GRAM STAIN: CPT | Performed by: INTERNAL MEDICINE

## 2023-11-22 PROCEDURE — 87102 FUNGUS ISOLATION CULTURE: CPT | Performed by: INTERNAL MEDICINE

## 2023-11-22 PROCEDURE — 250N000011 HC RX IP 250 OP 636: Performed by: INTERNAL MEDICINE

## 2023-11-22 PROCEDURE — 87206 SMEAR FLUORESCENT/ACID STAI: CPT | Performed by: INTERNAL MEDICINE

## 2023-11-22 PROCEDURE — 31624 DX BRONCHOSCOPE/LAVAGE: CPT

## 2023-11-22 PROCEDURE — 999N000157 HC STATISTIC RCP TIME EA 10 MIN

## 2023-11-22 PROCEDURE — 999N000055 HC STATISTIC END TITIAL CO2 MONITORING

## 2023-11-22 PROCEDURE — 99207 PR NO BILLABLE SERVICE THIS VISIT: CPT | Performed by: INTERNAL MEDICINE

## 2023-11-22 PROCEDURE — 87210 SMEAR WET MOUNT SALINE/INK: CPT | Performed by: INTERNAL MEDICINE

## 2023-11-22 PROCEDURE — 99152 MOD SED SAME PHYS/QHP 5/>YRS: CPT | Performed by: INTERNAL MEDICINE

## 2023-11-22 PROCEDURE — 250N000009 HC RX 250: Performed by: INTERNAL MEDICINE

## 2023-11-22 PROCEDURE — 88305 TISSUE EXAM BY PATHOLOGIST: CPT | Mod: TC | Performed by: INTERNAL MEDICINE

## 2023-11-22 PROCEDURE — 87116 MYCOBACTERIA CULTURE: CPT | Performed by: INTERNAL MEDICINE

## 2023-11-22 RX ORDER — ONDANSETRON 2 MG/ML
4 INJECTION INTRAMUSCULAR; INTRAVENOUS EVERY 6 HOURS PRN
Status: DISCONTINUED | OUTPATIENT
Start: 2023-11-22 | End: 2023-11-23 | Stop reason: HOSPADM

## 2023-11-22 RX ORDER — LIDOCAINE HYDROCHLORIDE 40 MG/ML
INJECTION, SOLUTION RETROBULBAR
Status: COMPLETED | OUTPATIENT
Start: 2023-11-22 | End: 2023-11-22

## 2023-11-22 RX ORDER — FLUMAZENIL 0.1 MG/ML
0.2 INJECTION, SOLUTION INTRAVENOUS
Status: ACTIVE | OUTPATIENT
Start: 2023-11-22 | End: 2023-11-22

## 2023-11-22 RX ORDER — ONDANSETRON 4 MG/1
4 TABLET, ORALLY DISINTEGRATING ORAL EVERY 6 HOURS PRN
Status: DISCONTINUED | OUTPATIENT
Start: 2023-11-22 | End: 2023-11-23 | Stop reason: HOSPADM

## 2023-11-22 RX ORDER — NALOXONE HYDROCHLORIDE 0.4 MG/ML
0.4 INJECTION, SOLUTION INTRAMUSCULAR; INTRAVENOUS; SUBCUTANEOUS
Status: DISCONTINUED | OUTPATIENT
Start: 2023-11-22 | End: 2023-11-23 | Stop reason: HOSPADM

## 2023-11-22 RX ORDER — NALOXONE HYDROCHLORIDE 0.4 MG/ML
0.2 INJECTION, SOLUTION INTRAMUSCULAR; INTRAVENOUS; SUBCUTANEOUS
Status: DISCONTINUED | OUTPATIENT
Start: 2023-11-22 | End: 2023-11-23 | Stop reason: HOSPADM

## 2023-11-22 RX ORDER — FENTANYL CITRATE 50 UG/ML
INJECTION, SOLUTION INTRAMUSCULAR; INTRAVENOUS
Status: COMPLETED | OUTPATIENT
Start: 2023-11-22 | End: 2023-11-22

## 2023-11-22 RX ORDER — LIDOCAINE 40 MG/G
CREAM TOPICAL
Status: DISCONTINUED | OUTPATIENT
Start: 2023-11-22 | End: 2023-11-23 | Stop reason: HOSPADM

## 2023-11-22 RX ORDER — LIDOCAINE HYDROCHLORIDE 20 MG/ML
SOLUTION OROPHARYNGEAL
Status: COMPLETED | OUTPATIENT
Start: 2023-11-22 | End: 2023-11-22

## 2023-11-22 RX ADMIN — FENTANYL CITRATE 50 MCG: 50 INJECTION INTRAMUSCULAR; INTRAVENOUS at 10:19

## 2023-11-22 RX ADMIN — LIDOCAINE HYDROCHLORIDE 6 ML: 40 INJECTION, SOLUTION RETROBULBAR; TOPICAL at 10:25

## 2023-11-22 RX ADMIN — LIDOCAINE HYDROCHLORIDE 10 ML: 40 INJECTION, SOLUTION RETROBULBAR; TOPICAL at 10:00

## 2023-11-22 RX ADMIN — LIDOCAINE HYDROCHLORIDE 10 ML: 20 SOLUTION ORAL; TOPICAL at 10:08

## 2023-11-22 RX ADMIN — FENTANYL CITRATE 25 MCG: 50 INJECTION INTRAMUSCULAR; INTRAVENOUS at 10:21

## 2023-11-22 RX ADMIN — LIDOCAINE HYDROCHLORIDE 12 ML: 10 INJECTION, SOLUTION EPIDURAL; INFILTRATION; INTRACAUDAL; PERINEURAL at 10:26

## 2023-11-22 RX ADMIN — MIDAZOLAM 0.5 MG: 1 INJECTION INTRAMUSCULAR; INTRAVENOUS at 10:20

## 2023-11-22 RX ADMIN — MIDAZOLAM 1 MG: 1 INJECTION INTRAMUSCULAR; INTRAVENOUS at 10:18

## 2023-11-22 RX ADMIN — MIDAZOLAM 0.5 MG: 1 INJECTION INTRAMUSCULAR; INTRAVENOUS at 10:23

## 2023-11-22 RX ADMIN — TOPICAL ANESTHETIC 1 SPRAY: 200 SPRAY DENTAL; PERIODONTAL at 10:19

## 2023-11-22 NOTE — PRE-PROCEDURE
GENERAL PRE-PROCEDURE:     Written consent obtained?: Yes    Risks and benefits: Risks, benefits and alternatives were discussed    Consent given by:  Patient  Patient states understanding of procedure being performed: Yes    Patient's understanding of procedure matches consent: Yes    Procedure consent matches procedure scheduled: Yes    Expected level of sedation:  Moderate  Appropriately NPO:  Yes  Mallampati  :  Grade 2- soft palate, base of uvula, tonsillar pillars, and portion of posterior pharyngeal wall visible  Lungs:  Other (comment)  Lung exam comment:  Faint rhonchi left base, otherwise clear  Heart:  Normal heart sounds and rate  History & Physical reviewed:  History and physical reviewed and no updates needed  Statement of review:  I have reviewed the lab findings, diagnostic data, medications, and the plan for sedation    Mihai Ross MD  Pulmonary and Critical Care Medicine  [facility]  Office [phone]

## 2023-11-22 NOTE — PROGRESS NOTES
FIBEROPTIC BRONCHOSCOPY PROCEDURE NOTE (NON-OR)  Date of Procedure: 11/22/2023  Performing Physician: Mihai Ross MD  Pre-Procedure Diagnosis: bronchiectasis, history of nontuberculous mycobacterial infection  Post-Procedure Diagnosis: same as pre-procedure diagnosis  Procedure: diagnostic flexible fiberoptic bronchoscopy   Indications: bronchiectasis, history of nontuberculous mycobacterial infection  Preop evaluation:  Procedural Sedation: intravenous sedation   Expected level: moderate sedation  ASA Class: ASA 4 - Patient with severe systemic disease that is a constant threat to life  Mallampati: II (hard and soft palate, upper portion of tonsils anduvula visible)  Respiratory Precautions: The patient was evaluated for TB risk prior to the procedure. The risk was judged to be low.  Anesthesia:  lidocaine 4% 4 mL nebulized  viscous 2% lidocaine 15 mL gargled  benzocaine 20% spray, 12 sprays in the posterior oropharynx  midazolam 2 mg IV  fentanyl 75 mcg IV  lidocaine 4% 6 mL on the vocal cords  lidocaine 1% 12 mL on tracheobronchial mucosa  Specimen: bronchoalveolar lavage, right middle lobe: 75 mL instilled, 30 mL slightly cloudy return  Estimated Blood Loss: minimal  Complications: none immediate     Procedure Details and Findings:   The patient was seen and the risks, benefits, complications, treatment options, and expected outcomes were discussed with the patient. The risks and potential complications of their problem and proposed treatment include but are not limited to infection, bleeding, pain, adverse drug reaction, pulmonary aspiration, the need for additional procedures, failure to diagnose a condition, creating a complication requiring transfusion or operation, complication secondary to the anesthetic, and death. The patient concurred with the proposed plan, giving informed consent. The patient was identified as Joan Estevez with date of birth 1955 and the procedure verified as  diagnostic flexible fiberoptic bronchoscopy with bronchoalveolar lavage. A time out was held and the above information confirmed.    After application of anesthesia, the patient was placed in the supine position and the bronchoscope was passed through the mouth. There was noted to be mild arytenoid hyperplasia and erythema. Vocal cord anatomy and motion was normal. Tracheal anatomy and mucosa was normal. The francisco was sharp. The bronchial tree bilaterally showed normal anatomy and mucosa. Moderate mucoid secretions were noted in the right middle lobe and suctioned easily. Bronchoalveolar lavage was performed in the right middle lobe with slightly cloudy return. The left lower lobe branches showed moderate mucoid secretions that were easily suctioned. No bloody secretions were noted. The bronchoscope was withdrawn and the procedure concluded.    Treatment Plan Based on Findings:  - Melinda Barrett, pulmonary NP, will follow up on bronchoalveolar lavage results and coordinate a treatment plan with the patient    Mihai Ross MD  Pulmonary and Critical Care Medicine  [facility]  Office [phone]

## 2023-11-22 NOTE — OR NURSING
Patient reported that she did not know as the pre-call procedure RN did not communicate with her to stop taking the warfarin. Patient reports taking her   warfarin ANTICOAGULANT (COUMADIN) 5 MG tablet yesterday, 11/21 at 1200. This RN communicated to Dr. Macedo who stated that it would be fine as there are no biopsies done. This was also communicated to the patient, who verbalizes understanding.

## 2023-11-22 NOTE — DISCHARGE INSTRUCTIONS
1. You are required to have someone accompany you home.    2. Rest today. Do not drive or operate machinery today. Over-activity may produce nausea and dizziness.    3. You should follow your normal diet. Drink plenty of fluids. Do not drink any alcoholic beverages for 24 hours. *(Alcohol may interact with the medications you received today).    4. NO HOT FOODS or LIQUIDS FOR 6 HOURS after the procedure.    5. You may have a sore throat or cough, or cough up small amounts of blood. This is normal. These symptoms should resolve in 24 hours.     6. If you have shortness of breath, a temperature above 101.1 degree F for more than 24 hours, or bleeding from your nose or throat. Call your doctor or go to the Emergency Room with any of these signs.    7. If you have further questions call your doctor               Melinda Barrett [phone]

## 2023-11-24 LAB
BACTERIA BRONCH: NORMAL
GRAM STAIN RESULT: NORMAL
GRAM STAIN RESULT: NORMAL

## 2023-11-27 ENCOUNTER — TELEPHONE (OUTPATIENT)
Dept: PULMONOLOGY | Facility: CLINIC | Age: 68
End: 2023-11-27
Payer: COMMERCIAL

## 2023-11-27 LAB
PATH REPORT.COMMENTS IMP SPEC: NORMAL
PATH REPORT.FINAL DX SPEC: NORMAL
PATH REPORT.GROSS SPEC: NORMAL
PATH REPORT.MICROSCOPIC SPEC OTHER STN: NORMAL
PATH REPORT.RELEVANT HX SPEC: NORMAL

## 2023-11-27 PROCEDURE — 88108 CYTOPATH CONCENTRATE TECH: CPT | Mod: 26 | Performed by: PATHOLOGY

## 2023-11-27 PROCEDURE — 88305 TISSUE EXAM BY PATHOLOGIST: CPT | Mod: 26 | Performed by: PATHOLOGY

## 2023-11-27 NOTE — TELEPHONE ENCOUNTER
Updated Joan with the preliminary BAL results. She continues to be symptomatic but is not feeling worse. She has been using nebulizer treatments a few times daily with relief. She has not questions at this time. Planned follow up in a few weeks with Dr. Scott and ID.     Melinda Barrett, CNP  Pulmonary Medicine  [facility]   [phone]

## 2023-11-29 ENCOUNTER — LAB REQUISITION (OUTPATIENT)
Dept: LAB | Facility: CLINIC | Age: 68
End: 2023-11-29

## 2023-11-29 ENCOUNTER — HOSPITAL ENCOUNTER (OUTPATIENT)
Dept: CT IMAGING | Facility: HOSPITAL | Age: 68
Discharge: HOME | End: 2023-11-29
Attending: STUDENT IN AN ORGANIZED HEALTH CARE EDUCATION/TRAINING PROGRAM | Admitting: STUDENT IN AN ORGANIZED HEALTH CARE EDUCATION/TRAINING PROGRAM
Payer: COMMERCIAL

## 2023-11-29 ENCOUNTER — TELEPHONE (OUTPATIENT)
Dept: INFECTIOUS DISEASES | Facility: CLINIC | Age: 68
End: 2023-11-29
Payer: COMMERCIAL

## 2023-11-29 DIAGNOSIS — R31.9 HEMATURIA, UNSPECIFIED: ICD-10-CM

## 2023-11-29 DIAGNOSIS — R31.0 GROSS HEMATURIA: ICD-10-CM

## 2023-11-29 DIAGNOSIS — R31.9 HEMATURIA: ICD-10-CM

## 2023-11-29 LAB
CREAT BLD-MCNC: 0.9 MG/DL (ref 0.6–1.1)
EGFRCR SERPLBLD CKD-EPI 2021: >60 ML/MIN/1.73M2

## 2023-11-29 PROCEDURE — 82565 ASSAY OF CREATININE: CPT

## 2023-11-29 PROCEDURE — 250N000011 HC RX IP 250 OP 636: Mod: JZ | Performed by: STUDENT IN AN ORGANIZED HEALTH CARE EDUCATION/TRAINING PROGRAM

## 2023-11-29 PROCEDURE — 87086 URINE CULTURE/COLONY COUNT: CPT | Performed by: STUDENT IN AN ORGANIZED HEALTH CARE EDUCATION/TRAINING PROGRAM

## 2023-11-29 PROCEDURE — 80069 RENAL FUNCTION PANEL: CPT | Performed by: STUDENT IN AN ORGANIZED HEALTH CARE EDUCATION/TRAINING PROGRAM

## 2023-11-29 PROCEDURE — 74178 CT ABD&PLV WO CNTR FLWD CNTR: CPT

## 2023-11-29 RX ORDER — IOPAMIDOL 755 MG/ML
90 INJECTION, SOLUTION INTRAVASCULAR ONCE
Status: COMPLETED | OUTPATIENT
Start: 2023-11-29 | End: 2023-11-29

## 2023-11-29 RX ADMIN — IOPAMIDOL 90 ML: 755 INJECTION, SOLUTION INTRAVENOUS at 19:29

## 2023-11-29 NOTE — TELEPHONE ENCOUNTER
[facility] Call Center    Phone Message    May a detailed message be left on voicemail: yes     Reason for Call: Other: Pt is calling to speak to someone about having blood in her stool. Pt would like a call back at [phone].     Action Taken: Message routed to:  Other: [facility] ID    Travel Screening: Not Applicable

## 2023-11-30 ENCOUNTER — HOSPITAL ENCOUNTER (EMERGENCY)
Facility: HOSPITAL | Age: 68
Discharge: HOME | End: 2023-11-30
Attending: EMERGENCY MEDICINE | Admitting: EMERGENCY MEDICINE
Payer: COMMERCIAL

## 2023-11-30 VITALS
SYSTOLIC BLOOD PRESSURE: 158 MMHG | OXYGEN SATURATION: 98 % | RESPIRATION RATE: 16 BRPM | DIASTOLIC BLOOD PRESSURE: 82 MMHG | HEART RATE: 82 BPM | HEIGHT: 65 IN | TEMPERATURE: 97.5 F | WEIGHT: 144 LBS | BODY MASS INDEX: 23.99 KG/M2

## 2023-11-30 DIAGNOSIS — R31.9 HEMATURIA, UNSPECIFIED TYPE: ICD-10-CM

## 2023-11-30 LAB
ALBUMIN SERPL BCG-MCNC: 4.1 G/DL (ref 3.5–5.2)
ALBUMIN UR-MCNC: ABNORMAL G/DL
ANION GAP SERPL CALCULATED.3IONS-SCNC: 11 MMOL/L (ref 7–15)
ANION GAP SERPL CALCULATED.3IONS-SCNC: 9 MMOL/L (ref 7–15)
APPEARANCE UR: ABNORMAL
BILIRUB UR QL STRIP: ABNORMAL
BUN SERPL-MCNC: 14.1 MG/DL (ref 8–23)
BUN SERPL-MCNC: 14.7 MG/DL (ref 8–23)
CALCIUM SERPL-MCNC: 9.5 MG/DL (ref 8.8–10.2)
CALCIUM SERPL-MCNC: 9.5 MG/DL (ref 8.8–10.2)
CHLORIDE SERPL-SCNC: 103 MMOL/L (ref 98–107)
CHLORIDE SERPL-SCNC: 99 MMOL/L (ref 98–107)
COLOR UR AUTO: ABNORMAL
CREAT SERPL-MCNC: 0.64 MG/DL (ref 0.51–0.95)
CREAT SERPL-MCNC: 0.68 MG/DL (ref 0.51–0.95)
DEPRECATED HCO3 PLAS-SCNC: 27 MMOL/L (ref 22–29)
DEPRECATED HCO3 PLAS-SCNC: 28 MMOL/L (ref 22–29)
EGFRCR SERPLBLD CKD-EPI 2021: >90 ML/MIN/1.73M2
EGFRCR SERPLBLD CKD-EPI 2021: >90 ML/MIN/1.73M2
ERYTHROCYTE [DISTWIDTH] IN BLOOD BY AUTOMATED COUNT: 12.9 % (ref 10–15)
GLUCOSE SERPL-MCNC: 111 MG/DL (ref 70–99)
GLUCOSE SERPL-MCNC: 137 MG/DL (ref 70–99)
GLUCOSE UR STRIP-MCNC: ABNORMAL MG/DL
HCT VFR BLD AUTO: 42.1 % (ref 35–47)
HGB BLD-MCNC: 13.8 G/DL (ref 11.7–15.7)
HGB UR QL STRIP: ABNORMAL
HOLD SPECIMEN: NORMAL
INR PPP: 4.27 (ref 0.85–1.15)
KETONES UR STRIP-MCNC: ABNORMAL MG/DL
LEUKOCYTE ESTERASE UR QL STRIP: ABNORMAL
MCH RBC QN AUTO: 28.8 PG (ref 26.5–33)
MCHC RBC AUTO-ENTMCNC: 32.8 G/DL (ref 31.5–36.5)
MCV RBC AUTO: 88 FL (ref 78–100)
NITRATE UR QL: ABNORMAL
PH UR STRIP: ABNORMAL [PH]
PHOSPHATE SERPL-MCNC: 2.8 MG/DL (ref 2.5–4.5)
PLATELET # BLD AUTO: 269 10E3/UL (ref 150–450)
POTASSIUM SERPL-SCNC: 3.9 MMOL/L (ref 3.4–5.3)
POTASSIUM SERPL-SCNC: 4.1 MMOL/L (ref 3.4–5.3)
RBC # BLD AUTO: 4.8 10E6/UL (ref 3.8–5.2)
RBC URINE: >182 /HPF
SODIUM SERPL-SCNC: 135 MMOL/L (ref 135–145)
SODIUM SERPL-SCNC: 142 MMOL/L (ref 135–145)
SP GR UR STRIP: ABNORMAL
UROBILINOGEN UR STRIP-MCNC: ABNORMAL MG/DL
WBC # BLD AUTO: 11 10E3/UL (ref 4–11)
WBC URINE: 5 /HPF

## 2023-11-30 PROCEDURE — 80048 BASIC METABOLIC PNL TOTAL CA: CPT | Performed by: EMERGENCY MEDICINE

## 2023-11-30 PROCEDURE — 36415 COLL VENOUS BLD VENIPUNCTURE: CPT | Performed by: EMERGENCY MEDICINE

## 2023-11-30 PROCEDURE — 85027 COMPLETE CBC AUTOMATED: CPT | Performed by: EMERGENCY MEDICINE

## 2023-11-30 PROCEDURE — 81001 URINALYSIS AUTO W/SCOPE: CPT | Performed by: EMERGENCY MEDICINE

## 2023-11-30 PROCEDURE — 99284 EMERGENCY DEPT VISIT MOD MDM: CPT

## 2023-11-30 PROCEDURE — 85610 PROTHROMBIN TIME: CPT | Performed by: EMERGENCY MEDICINE

## 2023-11-30 PROCEDURE — 250N000013 HC RX MED GY IP 250 OP 250 PS 637: Performed by: EMERGENCY MEDICINE

## 2023-11-30 RX ORDER — ACETAMINOPHEN 325 MG/1
650 TABLET ORAL ONCE
Status: COMPLETED | OUTPATIENT
Start: 2023-11-30 | End: 2023-11-30

## 2023-11-30 RX ORDER — OXYCODONE HYDROCHLORIDE 5 MG/1
5 TABLET ORAL EVERY 6 HOURS PRN
Qty: 12 TABLET | Refills: 0 | Status: SHIPPED | OUTPATIENT
Start: 2023-11-30 | End: 2023-12-03

## 2023-11-30 RX ORDER — PHYTONADIONE 5 MG/1
5 TABLET ORAL ONCE
Status: DISCONTINUED | OUTPATIENT
Start: 2023-11-30 | End: 2023-11-30

## 2023-11-30 RX ORDER — ACETAMINOPHEN 325 MG/1
975 TABLET ORAL ONCE
Status: DISCONTINUED | OUTPATIENT
Start: 2023-11-30 | End: 2023-11-30

## 2023-11-30 RX ORDER — ACETAMINOPHEN 500 MG
1000 TABLET ORAL 3 TIMES DAILY
Qty: 30 TABLET | Refills: 0 | Status: SHIPPED | OUTPATIENT
Start: 2023-11-30

## 2023-11-30 RX ORDER — ONDANSETRON 4 MG/1
4 TABLET, ORALLY DISINTEGRATING ORAL EVERY 6 HOURS PRN
Qty: 10 TABLET | Refills: 0 | Status: SHIPPED | OUTPATIENT
Start: 2023-11-30

## 2023-11-30 RX ORDER — ONDANSETRON 4 MG/1
4 TABLET, ORALLY DISINTEGRATING ORAL ONCE
Status: DISCONTINUED | OUTPATIENT
Start: 2023-11-30 | End: 2023-11-30

## 2023-11-30 RX ORDER — IBUPROFEN 600 MG/1
600 TABLET, FILM COATED ORAL EVERY 6 HOURS PRN
Qty: 30 TABLET | Refills: 0 | Status: SHIPPED | OUTPATIENT
Start: 2023-11-30 | End: 2023-11-30

## 2023-11-30 RX ORDER — IBUPROFEN 200 MG
400 TABLET ORAL EVERY 6 HOURS PRN
Qty: 30 TABLET | Refills: 0 | Status: SHIPPED | OUTPATIENT
Start: 2023-11-30

## 2023-11-30 RX ADMIN — PHYTONADIONE 5 MG: 1 INJECTION, EMULSION INTRAMUSCULAR; INTRAVENOUS; SUBCUTANEOUS at 05:03

## 2023-11-30 RX ADMIN — ACETAMINOPHEN 650 MG: 325 TABLET, FILM COATED ORAL at 04:37

## 2023-11-30 ASSESSMENT — ACTIVITIES OF DAILY LIVING (ADL)
ADLS_ACUITY_SCORE: 33
ADLS_ACUITY_SCORE: 35

## 2023-11-30 NOTE — ED TRIAGE NOTES
Pt reports sudden bleeding with urination yesterday, had some type of ct scan yesterday outpatient that was not read yet.  Pt complaining of severe pain and nausea.  Pt denies hx of kidney stones.     Triage Assessment (Adult)       Row Name 11/30/23 0144          Triage Assessment    Airway WDL WDL        Respiratory WDL    Respiratory WDL WDL        Skin Circulation/Temperature WDL    Skin Circulation/Temperature WDL WDL        Cardiac WDL    Cardiac WDL WDL        Peripheral/Neurovascular WDL    Peripheral Neurovascular WDL WDL        Cognitive/Neuro/Behavioral WDL    Cognitive/Neuro/Behavioral WDL WDL

## 2023-11-30 NOTE — DISCHARGE INSTRUCTIONS
Please take 1000 mg of Tylenol 3 times a day, you can take your next dose at 10 AM. Starting at 10AM, you can also start taking 400 mg of ibuprofen every 4-6 hours for sever pain. It is okay to take tylenol and ibuprofen at the same time.  After 2 days, please take either medication only as needed.    If your pain is intractable, please come back to the ER.    Please keep your appointment with your primary care doctor tomorrow. You will need to have your INR checked. You were given a dose of vitamin K to help the bleeding.     I placed a referral to urology, someone should call later today to help set up an appointment.

## 2023-11-30 NOTE — ED PROVIDER NOTES
EMERGENCY DEPARTMENT ENCOUNTER      NAME: Joan Estevez  AGE: 68 year old female  YOB: 1955  MRN: [id number]  EVALUATION DATE & TIME: 11/30/2023  1:46 AM    PCP: [facility] Mccoy    ED PROVIDER: Darek Little M.D.      Chief Complaint   Patient presents with    Hematuria    Flank Pain           Emesis       FINAL IMPRESSION:  1. Hematuria, unspecified type        ED COURSE & MEDICAL DECISION MAKING:    Pertinent Labs & Imaging studies reviewed below.  All EKGs below represent my independent interpretation.   ED Course as of 11/30/23 0509   Thu Nov 30, 2023   0253 Patient is a 68-year-old woman who presents with little less than 24 hours of hematuria, and right flank pain that began about 4-5 hours ago.  She is on Coumadin, INR yesterday was 5.  No history of kidney stones.  On arrival to the emergency department his blood pressure of 167/84 with normal temperature and heart rate.  Pain is significantly improved, and now only 1-2 out of 10.  CT urogram already performed, I will call [facility] to see if they can read this.  Kidney stone/nephritis, UTI, differential.   0300 I spoke to [facility], they will push the images from last night through to the radiologist to be read as stat.   0405 CT urogram read: 1.  No radiodense kidney/ureteral stones or hydronephrosis in either kidney. There is right extrarenal pelvis. Few small filling defects in the right renal pelvis, indeterminate, could represent small clots versus less likely urothelial neoplasms.  2.  1.6 cm hypodense lesion in the medial aspect of hepatic segment 7, slightly decreased as compared to 10/07/2021 exam, although technically indeterminate, likely benign lesion likely hepatic hemangioma given its interval decrease in size.  3.  Numerous small basilar pulmonary nodules, likely infectious.     Pt continues to have 1-2/10 pain. Sx c/w renal colic due to bleeding/clots. Unclear underlying etiology. She was given 5 mg  of oral vitamin K to help reverse INR which is at 4.27 now. She is s/p ablation for afib, not high risk for stroke. She also has normal renal function. We discussed return precautions. I placed referral to urology. She has PCP follow up tomorrow where her INR and renal function and sx can be rechecked. No fever, normal WBC, I dont think sx are due to pyelonephritis.      Additional ED Course Timestamps:  2:15 AM I met with patient for initial interview and encounter. We also discussed plan for treatment and diagnostic interventions.   2:22 AM Called radiology tech to see if we are able to get an official read on patient's outpatient CT urogram from yesterday.    Medical Decision Making    History:  Supplemental history from: N/A  External Record(s) reviewed: Documented in chart, if applicable.    Work Up:  Chart documentation includes differential considered and any EKGs or imaging independently interpreted by provider, where specified.  In additional to work up documented, I considered the following work up: Documented in chart, if applicable.    External consultation:  Discussion of management with another provider: Radiology (regarding imaging)    Complicating factors:  Care impacted by chronic illness: Anticoagulated State  Care affected by social determinants of health: Access to Medical Care    Disposition considerations: Discharge. I prescribed additional prescription strength medication(s) as charted. I considered admission, but discharged patient after significant clinical improvement.    At the conclusion of the encounter I discussed the results of all of the tests and the disposition. The questions were answered. The patient or family acknowledged understanding and was agreeable with the care plan.       MEDICATIONS GIVEN IN THE EMERGENCY:  Medications   acetaminophen (TYLENOL) tablet 650 mg (650 mg Oral $Given 11/30/23 4638)   phytonadione (MEPHYTON/VITAMIN K) 1 MG/ML oral solution 5 mg (5 mg Oral $Given  "11/30/23 0503)       NEW PRESCRIPTIONS STARTED AT TODAY'S ER VISIT  New Prescriptions    ACETAMINOPHEN (TYLENOL) 500 MG TABLET    Take 2 tablets (1,000 mg) by mouth 3 times daily    IBUPROFEN (ADVIL/MOTRIN) 200 MG TABLET    Take 2 tablets (400 mg) by mouth every 6 hours as needed for moderate pain    ONDANSETRON (ZOFRAN ODT) 4 MG ODT TAB    Take 1 tablet (4 mg) by mouth every 6 hours as needed for nausea    OXYCODONE (ROXICODONE) 5 MG TABLET    Take 1 tablet (5 mg) by mouth every 6 hours as needed for pain        =================================================================    HPI    Patient information was obtained from: Patient    Use of : N/A     Joan Estevez is a 68 year old female with a pertinent history of a-fib on warfarin, who presents to this ED for evaluation of flank pain.    Patient reports that she went to void yesterday morning with some gross hematuria. However, later in the day, about 2330 PM, had an acute onset of right flank pain that was initially a 10/10 in severity. Flank pain has improved to a 2/10 at present. No known history of kidney stones.     Of note, patient was instructed to discontinue her warfarin just for today and tomorrow as her INR was 5.6.       VITALS:  BP (!) 167/84   Pulse 85   Temp 97.5  F (36.4  C) (Temporal)   Resp 20   Ht 1.651 m (5' 5\")   Wt 65.3 kg (144 lb)   SpO2 98%   BMI 23.96 kg/m      PHYSICAL EXAM    Constitutional: Well developed, well nourished. Comfortable appearing.  HENT: Normocephalic, atraumatic, mucous membranes moist, nose normal. Neck- Supple, gross ROM intact.   Eyes: Pupils mid-range, conjunctiva without injection, no discharge.   Respiratory: Clear to auscultation bilaterally, no respiratory distress, no wheezing, speaks full sentences easily. No cough.  Cardiovascular: Normal heart rate, regular rhythm, no murmurs.   GI: Soft, no tenderness to deep palpation in all quadrants, no masses.  Musculoskeletal: Moving all 4 " extremities intentionally and without pain. No obvious deformity.  Skin: Warm, dry, no rash.  Neurologic: Alert & oriented x 3, cranial nerves grossly intact.  Psychiatric: Affect normal, cooperative.      I, Scooter Mariana am serving as a scribe to document services personally performed by Dr. Darek Little based on my observation and the provider's statements to me. I, Darek Little MD attest that Scooter Peña is acting in a scribe capacity, has observed my performance of the services and has documented them in accordance with my direction.    Darek Little M.D.  Emergency Medicine  [facility] EMERGENCY DEPARTMENT  [facility] [facility] [id number] [phone]  Dept: [phone]       Darek Little MD  11/30/23 0515

## 2023-12-01 LAB — BACTERIA UR CULT: NORMAL

## 2023-12-05 ENCOUNTER — TELEPHONE (OUTPATIENT)
Dept: PULMONOLOGY | Facility: CLINIC | Age: 68
End: 2023-12-05
Payer: COMMERCIAL

## 2023-12-05 NOTE — TELEPHONE ENCOUNTER
[facility] Call Center    Phone Message    May a detailed message be left on voicemail: yes     Reason for Call: Other: .     Patient states she seen Pulm with [place] and states the Pulm provider took a look at patients Last Lung Scan that was done last week. Patient states the provider did not see that the results were sent out to be checked out for Mac Pneumonia. Patient is wanting to confirm if that was done. Patient would like to get a call back. Please advise.        Action Taken: Message routed to:  [facility] Lung    Travel Screening: Not Applicable

## 2023-12-06 NOTE — TELEPHONE ENCOUNTER
Pt is calling back with the [place] fax number.  Please send lung scan to [place] Fax #: [phone] Atten: Dr. Knight  [place] provider needs a explanation of diagnosis of the CT of the MAC Pneumonia (if there is a diagnosis? Negative or positive)

## 2023-12-06 NOTE — TELEPHONE ENCOUNTER
Faxed CT scan and AFB results to [place] Fax #: [phone] Attn: Dr. Knight per patient request.

## 2023-12-13 ENCOUNTER — APPOINTMENT (OUTPATIENT)
Dept: LAB | Facility: CLINIC | Age: 68
End: 2023-12-13
Attending: FAMILY MEDICINE
Payer: COMMERCIAL

## 2023-12-14 ENCOUNTER — DOCUMENTATION ONLY (OUTPATIENT)
Dept: PULMONOLOGY | Facility: CLINIC | Age: 68
End: 2023-12-14
Payer: COMMERCIAL

## 2023-12-14 ENCOUNTER — TELEPHONE (OUTPATIENT)
Dept: PULMONOLOGY | Facility: CLINIC | Age: 68
End: 2023-12-14
Payer: COMMERCIAL

## 2023-12-14 DIAGNOSIS — J47.9 BRONCHIECTASIS WITHOUT COMPLICATION (H): ICD-10-CM

## 2023-12-14 DIAGNOSIS — A31.9 MYCOBACTERIA, ATYPICAL: Primary | ICD-10-CM

## 2023-12-14 NOTE — TELEPHONE ENCOUNTER
Patient called requesting results of sweat test from yesterday. No results noted in epic. Called sweat lab, sweat  said no order was found in epic, unsure which provider is ordering this test. Per patient, Dr. Dc Arredondo was the ordering provider. Sent message to sweat lab with providers name. Instructed patient to contact Dr. Scott clinic to review results.    Mona Ruiz RN

## 2023-12-19 ENCOUNTER — OFFICE VISIT (OUTPATIENT)
Dept: PULMONOLOGY | Facility: CLINIC | Age: 68
End: 2023-12-19
Attending: INTERNAL MEDICINE
Payer: COMMERCIAL

## 2023-12-19 ENCOUNTER — OFFICE VISIT (OUTPATIENT)
Dept: INFECTIOUS DISEASES | Facility: CLINIC | Age: 68
End: 2023-12-19
Payer: COMMERCIAL

## 2023-12-19 VITALS
SYSTOLIC BLOOD PRESSURE: 116 MMHG | BODY MASS INDEX: 24.13 KG/M2 | TEMPERATURE: 97.4 F | DIASTOLIC BLOOD PRESSURE: 60 MMHG | OXYGEN SATURATION: 97 % | HEART RATE: 76 BPM | WEIGHT: 145 LBS

## 2023-12-19 VITALS
OXYGEN SATURATION: 97 % | HEART RATE: 80 BPM | WEIGHT: 146 LBS | BODY MASS INDEX: 24.3 KG/M2 | SYSTOLIC BLOOD PRESSURE: 118 MMHG | DIASTOLIC BLOOD PRESSURE: 64 MMHG

## 2023-12-19 DIAGNOSIS — J18.9 PULMONARY INFECTION: ICD-10-CM

## 2023-12-19 DIAGNOSIS — J47.9 BRONCHIECTASIS WITHOUT COMPLICATION (H): ICD-10-CM

## 2023-12-19 DIAGNOSIS — R91.8 LUNG INFILTRATE ON CT: Primary | ICD-10-CM

## 2023-12-19 DIAGNOSIS — A31.9 ATYPICAL MYCOBACTERIUM DISEASE: Primary | ICD-10-CM

## 2023-12-19 PROCEDURE — 99213 OFFICE O/P EST LOW 20 MIN: CPT | Performed by: STUDENT IN AN ORGANIZED HEALTH CARE EDUCATION/TRAINING PROGRAM

## 2023-12-19 PROCEDURE — 99214 OFFICE O/P EST MOD 30 MIN: CPT | Performed by: INTERNAL MEDICINE

## 2023-12-19 RX ORDER — OMEPRAZOLE 20 MG/1
20 TABLET, DELAYED RELEASE ORAL DAILY
COMMUNITY

## 2023-12-19 NOTE — PROGRESS NOTES
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  She is in ID clinic for likely recurrent infection.  She complains of nonproductive cough similar to previous episodes in the past.  She constantly feels tired although she does not feel short of breath.  She denies fever but endorses hot and cold spells.  She report night sweats.  Her previous SOFYA was macrolide susceptible.  She was never a smoker although she has a 21-year secondhand smoking from both of her parents being smokers.  She has 2 siblings with no respiratory issues.  No history of recurrent infections.  The previous treatment regimen gave her some diarrhea but it was bearable.    23: In clinic today, the patient is doing overall well.  She was seen by pulmonary on 2023 and had bronchoscopy as discussed with Dr. Scott.  AFB smear negative.  Cultures negative at 4 weeks.  Clinically, she is doing better with better exercise tolerance, improved cough.  She still feels tired overall but better.  Denies any fever, chills, night sweats.  She was seen by pulmonary earlier today and the plan is to proceed with CT scan every 6 months.  Sweat test is in process.    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       Past Surgical History:  Past Surgical History:   Procedure Laterality Date    CARDIAC SURGERY      cardioversion    COLONOSCOPY      GYN SURGERY       hyster abdomen    HYSTERECTOMY      OOPHORECTOMY      ORTHOPEDIC SURGERY      arthroscopy left    ORTHOPEDIC SURGERY      right foot neuroma       Social History:  Social History     Socioeconomic History    Marital status:      Spouse name: Not on file    Number of children: Not on file    Years of education: Not on file    Highest education level: Not on file   Occupational History    Not on file   Tobacco Use    Smoking status: Never    Smokeless tobacco: Never   Vaping Use    Vaping Use: Never used   Substance and Sexual Activity    Alcohol use: No    Drug use: No    Sexual activity: Not on file   Other Topics Concern    Parent/sibling w/ CABG, MI or angioplasty before 65F 55M? Not Asked   Social History Narrative    Not on file     Social Determinants of Health     Financial Resource Strain: Not on file   Food Insecurity: Not on file   Transportation Needs: Not on file   Physical Activity: Not on file   Stress: Not on file   Social Connections: Not on file   Interpersonal Safety: Not on file   Housing Stability: Not on file       Family Medical History:  Family History   Problem Relation Age of Onset    Cancer Mother     Breast Cancer Paternal Aunt         early 50    Hereditary Breast and Ovarian Cancer Syndrome No family hx of     Colon Cancer No family hx of     Endometrial Cancer No family hx of     Ovarian Cancer No family hx of        Allergies:     Allergies   Allergen Reactions    Sulfa Antibiotics Rash       Medications:  Current Outpatient Medications   Medication Sig Dispense Refill    ipratropium - albuterol 0.5 mg/2.5 mg/3 mL (DUONEB) 0.5-2.5 (3) MG/3ML neb solution Take 1 vial (3 mLs) by nebulization every 6 hours as needed for shortness of breath, wheezing or cough 180 mL 3    multivitamin, therapeutic with minerals (MULTI-VITAMIN) TABS Take 1 tablet by mouth daily      omeprazole (PRILOSEC OTC) 20 MG EC tablet Take 20 mg by mouth daily      Sodium Chloride 3.5 % NEBU Inhale 1 Nebule  into the lungs as needed      warfarin ANTICOAGULANT (COUMADIN) 5 MG tablet Take 5 mg by mouth daily Pt takes 6 days per week      acetaminophen (TYLENOL) 500 MG tablet Take 2 tablets (1,000 mg) by mouth 3 times daily (Patient not taking: Reported on 12/19/2023) 30 tablet 0    albuterol (PROAIR HFA/PROVENTIL HFA/VENTOLIN HFA) 108 (90 Base) MCG/ACT inhaler Inhale 2 puffs into the lungs every 6 hours as needed (Patient not taking: Reported on 12/19/2023)      ibuprofen (ADVIL/MOTRIN) 200 MG tablet Take 2 tablets (400 mg) by mouth every 6 hours as needed for moderate pain (Patient not taking: Reported on 12/19/2023) 30 tablet 0    ondansetron (ZOFRAN ODT) 4 MG ODT tab Take 1 tablet (4 mg) by mouth every 6 hours as needed for nausea (Patient not taking: Reported on 12/19/2023) 10 tablet 0       Immunizations:  Immunization History   Administered Date(s) Administered    COVID-19 12+ (2023-24) (Pfizer) 09/21/2023    COVID-19 Bivalent 12+ (Pfizer) 10/24/2022    COVID-19 Monovalent 18+ (Moderna) 03/11/2021, 11/01/2021, 03/30/2022    Flu, Unspecified 12/03/2015       ==================    Review of System:  12 points review of system is negative except for findings in the HPI.    Exam  VS: /60 (BP Location: Right arm, Cuff Size: Adult Regular)   Pulse 76   Temp 97.4  F (36.3  C) (Oral)   Wt 65.8 kg (145 lb)   SpO2 97%   BMI 24.13 kg/m      Gen: Pleasant in no acute distress.  HEENT: NCAT. EOMI.  Neck: No LAD.  Lungs: Clear to auscultation bilaterally with no crackles or wheezes.   Card: RRR. No RMG. Peripheral pulses present and symmetrical. No edema.   Abd: Soft NT ND. No hepatomegaly or splenomegaly.  Ext: No edema  Lymph: No cervical or supraclavicular adenopathy.  Skin: No rash  Neuro: Alert and oriented to place time and person. Cranial nerves grossly intact.     Labs:  Reviewed    Imaging:  Reviewed    Assessment:  Joan Estevez is a 68 year old old female with bronchiectasis of unclear etiology.   This was complicated with SOFYA, macrolide susceptible, treated with azithromycin plus ethambutol plus rifabutin through 2020.  Recurrent symptoms with CT scan as above.  Status post bronchoscopy on 11/22/2023.  AFB smear negative.  Cultures no growth for 4 weeks.  Unlikely to turn positive afterward.  Clinically the patient is doing better.  As a result no intention to start on any treatment for SOFYA at this point.      Recommendations:  I concur with every 6-month follow-up CT scan as recommended by pulmonary.  Pulmonary to become primary team for the bronchiectasis management.  If there is sign of recurrent infection or worsening infection then the patient will be sent back to ID for management.      Elvin Pimentel MD  [facility]  [facility]  Office Telephone [phone].  Fax [phone]  [facility] paging

## 2023-12-19 NOTE — PROGRESS NOTES
PULMONARY OUTPATIENT FOLLOW UP NOTE        Assessment:      S/p treatment acute bronchitis / bronchiectasis exacerbation   Patient was treated with 5 days of azithromycin at the beginning of November.   Persistent respiratory symptoms, chest tightness, fatigue and cough, follow up chest CT scan 11/16/2023 showed progression of tree in bud opacities both bases R>L, mild mediastinal adenopathy.   S/p diagnostic bronchoscopy 11/22, BAL culture grew normal harjit. AFB stain was negative. AFB cultures are pending.  Currently , patient reports improvement of respiratory symptoms, mild chest tightness and ongoing fatigue.   Will follow up mycobacteria BAL culture.   Plan for follow up chest CT scan in 6 months sooner if worsening symptoms.   Bronchiectasis  Normal immunoglobulins, negative RF, negative CCP, normal HILDA.  Continue pulmonary toiletting.   Hypertonic saline nebs daily and increase the frequency as needed.   Continue treatment of GERD.   Sweat test was recommended by ID service, test was done but results are not in system. Lab was called, test results will be posted today.   S/p treatment of MAC  Macrolide susceptible, status post two year treatment through 2020   Mild obstructive pulmonary disease  No significant respiratory symptoms. Albuterol HFA as needed  GERD  Abnormal Bravo probe 15 April 2022.  Famotidine 40 mg daily      Plan:      Continue saline nebs daily and increase to twice a day if needed use with acapella device  Duonebs daily and every 6 hours as needed, use with acapella device  Avoid eating close to bedtime at least three hours   Raise the head of the bed  Famotidine 40 mg daily for acid reflux   Follow up chest CT scan 6 months   Follow up in 6 months      Dc Arredondo  Pulmonary / Critical Care  12/19/23     CC:     Chief Complaint   Patient presents with    Follow Up      HPI:      Joan Estevez is a 68 year old female who presents for evaluation of bronchiectasis.    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  Patient follows with Dr. Alejandre pulmonary at [facility] in a yearly bases.     Patient was treated with 5 days of azithromycin at the beginning of November, due to persistent respiratory symptoms, chest tightness, fatigue and cough, follow up chest CT scan done on 11/16/2023 showed progression of tree in bud opacities both bases R>L, mild mediastinal adenopathy.   Underwent diagnostic bronchoscopy 11/22, BAL culture grew normal harjit. AFB stain was negative. AFB cultures are pending.  Patient reports doing better, persistent mild chest tightness and ongoing fatigue, denies fever, chills or night sweats. No limitations with activities.   No hemoptysis. Uses saline 3% nebs daily , occasionally in the afternoon. Also uses duonebs daily.  Denies chest pain, orthopnea, PND, or swelling of LEs.  No sleeping problems.   Denies acid reflux, takes famotidine as needed.   In addition, recent ED visit on 11/29 for hematuria and right flank pain, CT scan showed diagnosed with small filling defects in the right renal pelvis , patient is followed by urology.         Past Medical History :     Past Medical History:   Diagnosis Date    Atrial fibrillation (H)     had cardioversion, has been in afib twice    Cancer (H)     squamous cell leg, excised    Undifferentiated connective tissue disease (H24)     targets the lungs          Medications:     acetaminophen (TYLENOL) 500 MG tablet, Take 2 tablets (1,000 mg) by mouth 3 times daily (Patient taking differently: Take 1,000 mg by mouth every 8 hours as needed)  albuterol (PROAIR HFA/PROVENTIL HFA/VENTOLIN HFA) 108 (90 Base) MCG/ACT inhaler, Inhale 2 puffs into the lungs every 6 hours as needed  ibuprofen (ADVIL/MOTRIN) 200 MG tablet, Take 2 tablets (400 mg) by mouth every 6 hours as needed  for moderate pain  ipratropium - albuterol 0.5 mg/2.5 mg/3 mL (DUONEB) 0.5-2.5 (3) MG/3ML neb solution, Take 1 vial (3 mLs) by nebulization every 6 hours as needed for shortness of breath, wheezing or cough  multivitamin, therapeutic with minerals (MULTI-VITAMIN) TABS, Take 1 tablet by mouth daily  omeprazole (PRILOSEC OTC) 20 MG EC tablet, Take 20 mg by mouth daily  ondansetron (ZOFRAN ODT) 4 MG ODT tab, Take 1 tablet (4 mg) by mouth every 6 hours as needed for nausea  Sodium Chloride 3.5 % NEBU, Inhale 1 Nebule into the lungs as needed  warfarin ANTICOAGULANT (COUMADIN) 5 MG tablet, Take 5 mg by mouth daily Pt takes 6 days per week    No current facility-administered medications on file prior to visit.       Social History :     Social History     Socioeconomic History    Marital status:      Spouse name: Not on file    Number of children: Not on file    Years of education: Not on file    Highest education level: Not on file   Occupational History    Not on file   Tobacco Use    Smoking status: Never    Smokeless tobacco: Never   Vaping Use    Vaping Use: Never used   Substance and Sexual Activity    Alcohol use: No    Drug use: No    Sexual activity: Not on file   Other Topics Concern    Parent/sibling w/ CABG, MI or angioplasty before 65F 55M? Not Asked   Social History Narrative    Not on file     Social Determinants of Health     Financial Resource Strain: Not on file   Food Insecurity: Not on file   Transportation Needs: Not on file   Physical Activity: Not on file   Stress: Not on file   Social Connections: Not on file   Interpersonal Safety: Not on file   Housing Stability: Not on file          Family History :     Family History   Problem Relation Age of Onset    Cancer Mother     Breast Cancer Paternal Aunt         early 50    Hereditary Breast and Ovarian Cancer Syndrome No family hx of     Colon Cancer No family hx of     Endometrial Cancer No family hx of     Ovarian Cancer No family hx of         Review of Systems  A 12 point comprehensive review of systems was negative except as noted.        Objective:     /64 (BP Location: Right arm, Patient Position: Chair, Cuff Size: Adult Regular)   Pulse 80   Wt 66.2 kg (146 lb)   SpO2 97%   BMI 24.30 kg/m        Gen: awake, alert, no distress  HEENT: pink conjunctiva, moist mucosa, Mallampati II/IV  Neck: no thyromegaly, masses or JVD  Lungs: discrete ronchi right base  CV: regular, no murmurs or gallops appreciated  Abdomen: soft, NT, BS wnl  Ext: no edema  Neuro: CN II-XII intact, non focal      Diagnostic tests:        Bronchoscopy : (11/22/2023)        Culture 2+ Normal harjit               Gram Stain Result >25 PMNs/low power field      1+ Mixed harjit           Acid Fast Stain No acid fast bacilli seen         11/22/23 10:57   Cell Count Fluid Source Lung, Middle Lobe, Right   RBC Fluid 2,096   Total Nucleated Cells 332   % Neutrophils Fluid 84   % Lymphocytes Fluid 4   % Mono/Macro Fluid 2   % Eosinophils Fluid 10   Color Fluid Pink !   Appearance Fluid Cloudy !     BRONCHOALVEOLAR LAVAGE, MIDDLE LOBE OF RIGHT LUNG:        -  MODERATE NUMBERS OF CHRONIC INFLAMMATORY CELLS        -  NEGATIVE FOR ATYPICAL OR MALIGNANT CELLS     PFTs:          IMAGES:     BRAVO STUDY 4/15/2022  OVERALL IMPRESSION:   Mildly increased esophageal  acid exposure time primarily in the upright position   No correlation of reflux with the symptoms of  belching  or  reflux .       CT CHEST WITHOUT IV CONTRAST  COMPARISON: Outside chest CT 10/7/2021 and 3/12/2021  FINDINGS:   There are findings of airway wall thickening with mucous plugging and mild bronchiolectasis. There is associated peripheral tree-in-bud opacities. Findings are greatest in the lingula and right   middle lobe. Compared to prior exams there is slight increased airspace disease at the left upper lobe as seen on series 3 image 88 through 171. Other areas of involvement which include upper and lower lungs  are stable.  Stable bilateral symmetric apical scarring. No lymphadenopathy.   Possible partial imaging of a 10 mm low dense lesion/cyst in the head/neck portion of the pancreas.   Finding could represent an IPMN. Could consider further evaluation with ultrasound imaging.   Otherwise noncontrast CT images through the upper abdomen are negative. Mild vascular calcification.   IMPRESSION   1. 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 There are mild new findings in the left upper lobe. Findings within the mid and lower lungs are otherwise stable.   2. Potential 10 mm cyst in the head and neck region of the pancreas. Consider ultrasound for further evaluation.     CT CHEST HI-RESOLUTION WO CONTRAST  LOCATION: [facility]  DATE: 11/16/2023  INDICATION: MAC with increased symptoms, minimal response to azithromycin  COMPARISON: CT chest 03/22/2022, 10/07/2021, 05/20/2017 reviewed.  FINDINGS:   LUNGS AND PLEURA: Scattered small irregular consolidative, tree-in-bud and bandlike opacities in both lungs, most prominently in the right middle lobe, lingula and right lower lobe. Portions of the upper lobes and left lower lobe are also involved although to lesser degree. Mild bronchiectasis, most prominently involving the right middle and both lower lobes, with scattered areas of mucous plugging. Overall findings have progressed since 03/22/2022, even more so since 10/07/2021. No subpleural reticular fibrosis or honeycombing. No evidence of air trapping on expiratory series.  MEDIASTINUM/AXILLAE: Few mildly enlarged mediastinal and right hilar lymph nodes are slightly larger than 03/22/2022 including a right hilar station 10R node measuring 1.4 x 2.2 cm compared to 0.9 x 1.7 cm previously. No pericardial effusion.  CORONARY ARTERY CALCIFICATION: Mild.  UPPER ABDOMEN: No significant finding.   MUSCULOSKELETAL: Mild scattered degenerative changes in the spine.  IMPRESSION:    1. Scattered irregular consolidative, tree-in-bud and bandlike opacities in both lungs with areas of bronchiectasis have overall progressed since 03/22/2022. Findings are typical of nontuberculous atypical mycobacterial/MAC infection.  2. Mild mediastinal and hilar adenopathy, slightly worse than 03/22/2022, likely reactive given the pulmonary parenchymal findings.

## 2023-12-19 NOTE — PATIENT INSTRUCTIONS
Continue saline nebs daily and increase to twice a day if needed  Duonebs daily and every 6 hours as needed  Avoid eating close to bedtime at least three hours   Raise the head of the bed  Famotidine 40 mg daily for acid reflux   Follow up chest CT scan 6 months   Follow up in 6 months

## 2023-12-20 LAB — BACTERIA BRONCH: ABNORMAL

## 2023-12-28 ENCOUNTER — TELEPHONE (OUTPATIENT)
Dept: PULMONOLOGY | Facility: CLINIC | Age: 68
End: 2023-12-28
Payer: COMMERCIAL

## 2023-12-28 DIAGNOSIS — J47.9 BRONCHIECTASIS WITHOUT COMPLICATION (H): Primary | ICD-10-CM

## 2023-12-28 DIAGNOSIS — A31.9 MYCOBACTERIA, ATYPICAL: ICD-10-CM

## 2023-12-28 NOTE — TELEPHONE ENCOUNTER
Spoke with Joan . Informed her that her insurance may not pay for the sweat test. She does NOT want to pay for this and is declining consent for this. Will inform Dr. Scott that consent was declined. Due to her diagnosis a waiver (ABN) form was needed. She will follow up with Dr. Scott, Hai and ID if she wants to pursue this in the future . Called the [facility] at [phone] that consent was declined.

## 2024-01-16 LAB
ACID FAST STAIN (ARUP): NORMAL

## 2024-01-18 LAB
ACID FAST STAIN (ARUP): NORMAL

## 2024-01-25 ENCOUNTER — DOCUMENTATION ONLY (OUTPATIENT)
Dept: PULMONOLOGY | Facility: CLINIC | Age: 69
End: 2024-01-25
Payer: COMMERCIAL

## 2024-01-25 NOTE — LETTER
"  1/25/2024      RE: Joan Estevez  [address] [id number]         It sounds as though Joan declined her previous sweat chloride test being reported out due to insurance coverage concerns, after being referred for a sweat test by Dr. Dc Arredondo.      Joan was not referred for genetic counseling so the CF GCs were not involved in the above. However, if clinical concerns for cystic fibrosis for Joan exist, Joan's care team is welcome to refer her at any time for genetic counseling (referral for the for [facility] GCs is \"adult genetics and metabolism\" in Epic) and we could help coordinate CFTR genetic testing to assess for possible cystic fibrosis. This option was shared with Dr. Dc Arredondo via Epic message. We remain available.      Kalee Franco MS, [facility]  Genetic Counseling  Genetics and Cystic Fibrosis Division  [facility]   Phone Number: [phone]  Pager: [phone]  Email: [email or web address]       "

## 2024-01-25 NOTE — PROGRESS NOTES
"It sounds as though Joan declined her previous sweat chloride test being reported out due to insurance coverage concerns, after being referred for a sweat test by Dr. Dc Arredondo.     Joan was not referred for genetic counseling so the CF GCs were not involved in the above. However, if clinical concerns for cystic fibrosis for Joan exist, Joan's care team is welcome to refer her at any time for genetic counseling (referral for the for [facility] GCs is \"adult genetics and metabolism\" in Epic) and we could help coordinate CFTR genetic testing to assess for possible cystic fibrosis. This option was shared with Dr. Dc Arredondo via Epic message. We remain available.      Kalee Franco MS, [facility]  Genetic Counseling  Genetics and Cystic Fibrosis Division  [facility]   Phone Number: [phone]  Pager: [phone]              "

## 2024-02-05 ENCOUNTER — MEDICAL CORRESPONDENCE (OUTPATIENT)
Dept: SCHEDULING | Facility: CLINIC | Age: 69
End: 2024-02-05
Payer: COMMERCIAL

## 2024-02-13 ENCOUNTER — ANCILLARY PROCEDURE (OUTPATIENT)
Dept: MAMMOGRAPHY | Facility: CLINIC | Age: 69
End: 2024-02-13
Attending: FAMILY MEDICINE
Payer: COMMERCIAL

## 2024-02-13 DIAGNOSIS — Z12.31 SCREENING MAMMOGRAM, ENCOUNTER FOR: ICD-10-CM

## 2024-02-13 PROCEDURE — 77067 SCR MAMMO BI INCL CAD: CPT

## 2024-02-26 ENCOUNTER — HOSPITAL ENCOUNTER (OUTPATIENT)
Dept: NUCLEAR MEDICINE | Facility: HOSPITAL | Age: 69
Discharge: HOME | End: 2024-02-26
Attending: UROLOGY | Admitting: UROLOGY
Payer: COMMERCIAL

## 2024-02-26 DIAGNOSIS — N13.5 CROSSING VESSEL AND STRICTURE OF URETER WITHOUT HYDRONEPHROSIS: ICD-10-CM

## 2024-02-26 PROCEDURE — 78708 K FLOW/FUNCT IMAGE W/DRUG: CPT

## 2024-02-26 PROCEDURE — 250N000011 HC RX IP 250 OP 636: Performed by: UROLOGY

## 2024-02-26 PROCEDURE — 343N000001 HC RX 343: Performed by: UROLOGY

## 2024-02-26 PROCEDURE — A9562 TC99M MERTIATIDE: HCPCS | Performed by: UROLOGY

## 2024-02-26 RX ORDER — FUROSEMIDE 10 MG/ML
40 INJECTION INTRAMUSCULAR; INTRAVENOUS ONCE
Status: COMPLETED | OUTPATIENT
Start: 2024-02-26 | End: 2024-02-26

## 2024-02-26 RX ADMIN — FUROSEMIDE 40 MG: 10 INJECTION, SOLUTION INTRAMUSCULAR; INTRAVENOUS at 09:07

## 2024-02-26 RX ADMIN — TECHNESCAN TC 99M MERTIATIDE 8.8 MILLICURIE: 1 INJECTION, POWDER, LYOPHILIZED, FOR SOLUTION INTRAVENOUS at 09:37

## 2024-04-04 ENCOUNTER — LAB REQUISITION (OUTPATIENT)
Dept: LAB | Facility: CLINIC | Age: 69
End: 2024-04-04

## 2024-04-04 DIAGNOSIS — R53.83 OTHER FATIGUE: ICD-10-CM

## 2024-04-04 DIAGNOSIS — E78.2 MIXED HYPERLIPIDEMIA: ICD-10-CM

## 2024-04-04 LAB
ERYTHROCYTE [DISTWIDTH] IN BLOOD BY AUTOMATED COUNT: 13.3 % (ref 10–15)
HCT VFR BLD AUTO: 39.9 % (ref 35–47)
HGB BLD-MCNC: 12.9 G/DL (ref 11.7–15.7)
MCH RBC QN AUTO: 28.4 PG (ref 26.5–33)
MCHC RBC AUTO-ENTMCNC: 32.3 G/DL (ref 31.5–36.5)
MCV RBC AUTO: 88 FL (ref 78–100)
PLATELET # BLD AUTO: 228 10E3/UL (ref 150–450)
RBC # BLD AUTO: 4.55 10E6/UL (ref 3.8–5.2)
WBC # BLD AUTO: 5.9 10E3/UL (ref 4–11)

## 2024-04-04 PROCEDURE — 80061 LIPID PANEL: CPT | Performed by: FAMILY MEDICINE

## 2024-04-04 PROCEDURE — 85027 COMPLETE CBC AUTOMATED: CPT | Performed by: FAMILY MEDICINE

## 2024-04-04 PROCEDURE — 80053 COMPREHEN METABOLIC PANEL: CPT | Performed by: FAMILY MEDICINE

## 2024-04-04 PROCEDURE — 84443 ASSAY THYROID STIM HORMONE: CPT | Performed by: FAMILY MEDICINE

## 2024-04-05 LAB
ALBUMIN SERPL BCG-MCNC: 4.1 G/DL (ref 3.5–5.2)
ALP SERPL-CCNC: 97 U/L (ref 40–150)
ALT SERPL W P-5'-P-CCNC: 14 U/L (ref 0–50)
ANION GAP SERPL CALCULATED.3IONS-SCNC: 14 MMOL/L (ref 7–15)
AST SERPL W P-5'-P-CCNC: 21 U/L (ref 0–45)
BILIRUB SERPL-MCNC: 0.5 MG/DL
BUN SERPL-MCNC: 16.5 MG/DL (ref 8–23)
CALCIUM SERPL-MCNC: 9.1 MG/DL (ref 8.8–10.2)
CHLORIDE SERPL-SCNC: 106 MMOL/L (ref 98–107)
CHOLEST SERPL-MCNC: 153 MG/DL
CREAT SERPL-MCNC: 0.62 MG/DL (ref 0.51–0.95)
DEPRECATED HCO3 PLAS-SCNC: 21 MMOL/L (ref 22–29)
EGFRCR SERPLBLD CKD-EPI 2021: >90 ML/MIN/1.73M2
FASTING STATUS PATIENT QL REPORTED: ABNORMAL
GLUCOSE SERPL-MCNC: 122 MG/DL (ref 70–99)
HDLC SERPL-MCNC: 44 MG/DL
LDLC SERPL CALC-MCNC: 94 MG/DL
NONHDLC SERPL-MCNC: 109 MG/DL
POTASSIUM SERPL-SCNC: 3.9 MMOL/L (ref 3.4–5.3)
PROT SERPL-MCNC: 6.6 G/DL (ref 6.4–8.3)
SODIUM SERPL-SCNC: 141 MMOL/L (ref 135–145)
TRIGL SERPL-MCNC: 74 MG/DL
TSH SERPL DL<=0.005 MIU/L-ACNC: 2.7 UIU/ML (ref 0.3–4.2)

## 2024-04-14 DIAGNOSIS — J47.9 BRONCHIECTASIS WITHOUT COMPLICATION (H): ICD-10-CM

## 2024-04-15 RX ORDER — IPRATROPIUM BROMIDE AND ALBUTEROL SULFATE 2.5; .5 MG/3ML; MG/3ML
1 SOLUTION RESPIRATORY (INHALATION) EVERY 6 HOURS PRN
Qty: 270 ML | Refills: 4 | Status: SHIPPED | OUTPATIENT
Start: 2024-04-15 | End: 2024-10-04

## 2024-05-21 ENCOUNTER — TELEPHONE (OUTPATIENT)
Dept: PULMONOLOGY | Facility: CLINIC | Age: 69
End: 2024-05-21
Payer: COMMERCIAL

## 2024-05-21 NOTE — TELEPHONE ENCOUNTER
Spoke with Joan. Explained to her that a chest xray is not the same as a CT scan. Our provider is wanting a Chest CT. She will contact the [facility] and see if they can change her xray to a CT.

## 2024-05-21 NOTE — TELEPHONE ENCOUNTER
[facility] Call Center    Phone Message    May a detailed message be left on voicemail: yes     Reason for Call: Other: Patient states that Dr. Scott ordered CHEST CT prior to follow up on 6/24. Patient states that she has an appt at [facility] this Thursday 5/23 and will be getting a CHEST XR. She is wondering whether she stills needs to have the CT done prior to her f/u? Please advise.     Action Taken: Other: PULM    Travel Screening: Not Applicable

## 2024-06-22 ENCOUNTER — HOSPITAL ENCOUNTER (OUTPATIENT)
Dept: CT IMAGING | Facility: HOSPITAL | Age: 69
Discharge: HOME | End: 2024-06-22
Attending: INTERNAL MEDICINE | Admitting: INTERNAL MEDICINE
Payer: COMMERCIAL

## 2024-06-22 DIAGNOSIS — J18.9 PULMONARY INFECTION: ICD-10-CM

## 2024-06-22 DIAGNOSIS — J47.9 BRONCHIECTASIS WITHOUT COMPLICATION (H): ICD-10-CM

## 2024-06-22 DIAGNOSIS — R91.8 LUNG INFILTRATE ON CT: ICD-10-CM

## 2024-06-22 PROCEDURE — 71250 CT THORAX DX C-: CPT

## 2024-06-24 ENCOUNTER — OFFICE VISIT (OUTPATIENT)
Dept: PULMONOLOGY | Facility: CLINIC | Age: 69
End: 2024-06-24
Attending: INTERNAL MEDICINE
Payer: COMMERCIAL

## 2024-06-24 VITALS
WEIGHT: 142 LBS | BODY MASS INDEX: 23.63 KG/M2 | SYSTOLIC BLOOD PRESSURE: 121 MMHG | HEART RATE: 84 BPM | DIASTOLIC BLOOD PRESSURE: 70 MMHG | OXYGEN SATURATION: 97 %

## 2024-06-24 DIAGNOSIS — J20.9 ACUTE BRONCHITIS, UNSPECIFIED ORGANISM: ICD-10-CM

## 2024-06-24 DIAGNOSIS — R91.8 LUNG INFILTRATE ON CT: ICD-10-CM

## 2024-06-24 DIAGNOSIS — J18.9 PULMONARY INFECTION: ICD-10-CM

## 2024-06-24 DIAGNOSIS — J47.9 BRONCHIECTASIS WITHOUT COMPLICATION (H): Primary | ICD-10-CM

## 2024-06-24 PROCEDURE — G2211 COMPLEX E/M VISIT ADD ON: HCPCS | Performed by: INTERNAL MEDICINE

## 2024-06-24 PROCEDURE — 99214 OFFICE O/P EST MOD 30 MIN: CPT | Performed by: INTERNAL MEDICINE

## 2024-06-24 RX ORDER — DIPHENHYDRAMINE HCL 25 MG
25 TABLET ORAL EVERY 6 HOURS PRN
COMMUNITY

## 2024-06-24 RX ORDER — DOXYCYCLINE 100 MG/1
100 CAPSULE ORAL 2 TIMES DAILY
Qty: 14 CAPSULE | Refills: 0 | Status: SHIPPED | OUTPATIENT
Start: 2024-06-24

## 2024-06-24 NOTE — PATIENT INSTRUCTIONS
Continue saline nebs daily and increase to twice a day if needed  Duonebs daily and every 6 hours as needed  Avoid eating close to bedtime at least three hours   Raise the head of the bed  Famotidine 40 mg daily for acid reflux  Doxycycline twice a day for 7 days if acute respiratory symptoms, Increase productive cough, shortness of breath    Follow up chest CT scan in one year   Follow up in 6 months

## 2024-07-03 NOTE — PROGRESS NOTES
PULMONARY OUTPATIENT FOLLOW UP NOTE        Assessment:      Bronchiectasis  Normal immunoglobulins, negative RF, negative CCP, normal HILDA.  Continue pulmonary toiletting.   DUONEBS daily, hypertonic saline nebs daily and increase the frequency as needed.   Continue treatment of GERD.   S/p treatment of MAC  Macrolide susceptible, status post two year treatment through 2020   Follow up chest CT scan 6/22/2024 showed multifocal bilateral bronchiolits, improved from previous scan, mild mediastinal adenopathy  Follow up chest CT scan in one year.   Mild obstructive pulmonary disease  No significant respiratory symptoms. Albuterol HFA as needed  GERD  Abnormal Bravo probe 15 April 2022.  Famotidine 40 mg daily      Plan:      Continue saline nebs daily and increase to twice a day if needed  Duonebs daily and every 6 hours as needed  Avoid eating close to bedtime at least three hours   Raise the head of the bed  Famotidine 40 mg daily for acid reflux  Doxycycline twice a day for 7 days if acute respiratory symptoms, Increase productive cough, shortness of breath    Follow up chest CT scan in one year   Follow up in 6 months      Dc Arredondo  Pulmonary / Critical Care  6/24/24       CC:     Chief Complaint   Patient presents with    Follow Up     6 month follow up: CT Chest 6/22/24  Lung infiltrate on CT  Bronchiectasis without complication (H)  Pulmonary infection   Patient recently diagnosed with Cystic Fibrosis at the [employer]      HPI:      Joan Estevez is a 69 year old female who presents for follow up.   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  Patient follows with Dr. Alejandre pulmonary at [facility] in a yearly bases.   Diagnostic bronchoscopy 11/22/2023, BAL culture grew normal harjit. Mycobacterial culture was negative.   Patient reports  doing well.   Mild chest tightness , denies fever, chills or night sweats. Mild fatigue.   No limitations with activities.   No hemoptysis. Uses duonebs and saline 3% nebs daily , occasionally in the afternoon.  Denies chest pain, orthopnea, PND, or swelling of LEs.  No sleeping problems.   Denies acid reflux, takes famotidine as needed.       Past Medical History :     Past Medical History:   Diagnosis Date    Atrial fibrillation (H)     had cardioversion, has been in afib twice    Cancer (H)     squamous cell leg, excised    Undifferentiated connective tissue disease (H24)     targets the lungs          Medications:     Current Outpatient Medications   Medication Sig Dispense Refill    acetaminophen (TYLENOL) 500 MG tablet Take 2 tablets (1,000 mg) by mouth 3 times daily 30 tablet 0    diphenhydrAMINE (BENADRYL) 25 MG tablet Take 25 mg by mouth every 6 hours as needed for itching or allergies      doxycycline hyclate (VIBRAMYCIN) 100 MG capsule Take 1 capsule (100 mg) by mouth 2 times daily 14 capsule 0    ipratropium - albuterol 0.5 mg/2.5 mg/3 mL (DUONEB) 0.5-2.5 (3) MG/3ML neb solution TAKE 1 VIAL (3 MLS) BY NEBULIZATION EVERY 6 HOURS AS NEEDED FOR SHORTNESS OF BREATH, WHEEZING OR COUGH 270 mL 4    multivitamin, therapeutic with minerals (MULTI-VITAMIN) TABS Take 1 tablet by mouth daily      omeprazole (PRILOSEC OTC) 20 MG EC tablet Take 20 mg by mouth daily      Sodium Chloride 3.5 % NEBU Inhale 1 Nebule into the lungs as needed      warfarin ANTICOAGULANT (COUMADIN) 5 MG tablet Take 5 mg by mouth daily Pt takes 6 days per week      albuterol (PROAIR HFA/PROVENTIL HFA/VENTOLIN HFA) 108 (90 Base) MCG/ACT inhaler Inhale 2 puffs into the lungs every 6 hours as needed (Patient not taking: Reported on 12/19/2023)      ibuprofen (ADVIL/MOTRIN) 200 MG tablet Take 2 tablets (400 mg) by mouth every 6 hours as needed for moderate pain 30 tablet 0    ondansetron (ZOFRAN ODT) 4 MG ODT tab Take 1 tablet (4 mg) by mouth  every 6 hours as needed for nausea 10 tablet 0     No current facility-administered medications for this visit.        Social History :     Social History     Socioeconomic History    Marital status:      Spouse name: Not on file    Number of children: Not on file    Years of education: Not on file    Highest education level: Not on file   Occupational History    Not on file   Tobacco Use    Smoking status: Never     Passive exposure: Past (Both parents were smokers; did eventually quit)    Smokeless tobacco: Never   Vaping Use    Vaping status: Never Used   Substance and Sexual Activity    Alcohol use: No    Drug use: No    Sexual activity: Not on file   Other Topics Concern    Parent/sibling w/ CABG, MI or angioplasty before 65F 55M? Not Asked   Social History Narrative    Not on file     Social Determinants of Health     Financial Resource Strain: Low Risk  (3/18/2022)    Received from [facility]    Overall Financial Resource Strain (CARDIA)     Difficulty of Paying Living Expenses: Not hard at all   Food Insecurity: No Food Insecurity (3/18/2022)    Received from [facility]    Hunger Vital Sign     Worried About Running Out of Food in the Last Year: Never true     Ran Out of Food in the Last Year: Never true   Transportation Needs: No Transportation Needs (3/18/2022)    Received from [facility]    PRAPARE - Transportation     Lack of Transportation (Medical): No     Lack of Transportation (Non-Medical): No   Physical Activity: Sufficiently Active (3/18/2022)    Received from [facility]    Exercise Vital Sign     Days of Exercise per Week: 7 days     Minutes of Exercise per Session: 150+ min   Stress: No Stress Concern Present (3/18/2022)    Received from [facility]    [employer] - Occupational Stress Questionnaire     Feeling of Stress : Not at all   Social Connections: Moderately Integrated (3/18/2022)    Received from [facility]    Social Connection and Isolation  Panel [NHANES]     Frequency of Communication with Friends and Family: More than three times a week     Frequency of Social Gatherings with Friends and Family: More than three times a week     Attends Mu-ism Services: More than 4 times per year     Active Member of Clubs or Organizations: Yes     Attends Club or Organization Meetings: More than 4 times per year     Marital Status:    Interpersonal Safety: Not At Risk (3/18/2022)    Received from [facility]    Humiliation, Afraid, Rape, and Kick questionnaire     Fear of Current or Ex-Partner: No     Emotionally Abused: No     Physically Abused: No     Sexually Abused: No   Housing Stability: Low Risk  (3/18/2022)    Received from [facility]    Housing Stability Vital Sign     Unable to Pay for Housing in the Last Year: No     Number of Places Lived in the Last Year: 1     In the last 12 months, was there a time when you did not have a steady place to sleep or slept in a shelter (including now)?: No          Family History :     Family History   Problem Relation Age of Onset    Cancer Mother     Breast Cancer Paternal Aunt         early 50    Hereditary Breast and Ovarian Cancer Syndrome No family hx of     Colon Cancer No family hx of     Endometrial Cancer No family hx of     Ovarian Cancer No family hx of        Review of Systems  A 12 point comprehensive review of systems was negative except as noted.        Objective:     /70 (BP Location: Left arm, Patient Position: Sitting, Cuff Size: Adult Regular)   Pulse 84   Wt 64.4 kg (142 lb)   SpO2 97%   BMI 23.63 kg/m        Gen: awake, alert, no distress  HEENT: pink conjunctiva, moist mucosa, Mallampati II/IV  Neck: no thyromegaly, masses or JVD  Lungs: discrete ronchi right base  CV: regular, no murmurs or gallops appreciated  Abdomen: soft, NT, BS wnl  Ext: no edema  Neuro: CN II-XII intact, non focal      Diagnostic tests:        Bronchoscopy : (11/22/2023)        Culture 2+ Normal harjit                Gram Stain Result >25 PMNs/low power field      1+ Mixed harjit           Mycobacteria culture Negative         11/22/23 10:57   Cell Count Fluid Source Lung, Middle Lobe, Right   RBC Fluid 2,096   Total Nucleated Cells 332   % Neutrophils Fluid 84   % Lymphocytes Fluid 4   % Mono/Macro Fluid 2   % Eosinophils Fluid 10   Color Fluid Pink !   Appearance Fluid Cloudy !     BRONCHOALVEOLAR LAVAGE, MIDDLE LOBE OF RIGHT LUNG:        -  MODERATE NUMBERS OF CHRONIC INFLAMMATORY CELLS        -  NEGATIVE FOR ATYPICAL OR MALIGNANT CELLS     PFTs:          IMAGES:     BRAVO STUDY 4/15/2022  OVERALL IMPRESSION:   Mildly increased esophageal  acid exposure time primarily in the upright position   No correlation of reflux with the symptoms of  belching  or  reflux .       CT CHEST WITHOUT IV CONTRAST  COMPARISON: Outside chest CT 10/7/2021 and 3/12/2021  FINDINGS:   There are findings of airway wall thickening with mucous plugging and mild bronchiolectasis. There is associated peripheral tree-in-bud opacities. Findings are greatest in the lingula and right   middle lobe. Compared to prior exams there is slight increased airspace disease at the left upper lobe as seen on series 3 image 88 through 171. Other areas of involvement which include upper and lower lungs are stable.  Stable bilateral symmetric apical scarring. No lymphadenopathy.   Possible partial imaging of a 10 mm low dense lesion/cyst in the head/neck portion of the pancreas.   Finding could represent an IPMN. Could consider further evaluation with ultrasound imaging.   Otherwise noncontrast CT images through the upper abdomen are negative. Mild vascular calcification.   IMPRESSION   1. 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 There are mild new findings in the left upper lobe. Findings within the mid and lower lungs are otherwise stable.   2. Potential 10 mm cyst in the head and neck region of the pancreas. Consider  ultrasound for further evaluation.     CT CHEST HI-RESOLUTION WO CONTRAST  LOCATION: [facility]  DATE: 11/16/2023  INDICATION: MAC with increased symptoms, minimal response to azithromycin  COMPARISON: CT chest 03/22/2022, 10/07/2021, 05/20/2017 reviewed.  FINDINGS:   LUNGS AND PLEURA: Scattered small irregular consolidative, tree-in-bud and bandlike opacities in both lungs, most prominently in the right middle lobe, lingula and right lower lobe. Portions of the upper lobes and left lower lobe are also involved although to lesser degree. Mild bronchiectasis, most prominently involving the right middle and both lower lobes, with scattered areas of mucous plugging. Overall findings have progressed since 03/22/2022, even more so since 10/07/2021. No subpleural reticular fibrosis or honeycombing. No evidence of air trapping on expiratory series.  MEDIASTINUM/AXILLAE: Few mildly enlarged mediastinal and right hilar lymph nodes are slightly larger than 03/22/2022 including a right hilar station 10R node measuring 1.4 x 2.2 cm compared to 0.9 x 1.7 cm previously. No pericardial effusion.  CORONARY ARTERY CALCIFICATION: Mild.  UPPER ABDOMEN: No significant finding.   MUSCULOSKELETAL: Mild scattered degenerative changes in the spine.  IMPRESSION:   1. Scattered irregular consolidative, tree-in-bud and bandlike opacities in both lungs with areas of bronchiectasis have overall progressed since 03/22/2022. Findings are typical of nontuberculous atypical mycobacterial/MAC infection.  2. Mild mediastinal and hilar adenopathy, slightly worse than 03/22/2022, likely reactive given the pulmonary parenchymal findings.    CT CHEST W/O CONTRAST  LOCATION: [facility]  DATE: 6/22/2024  INDICATION: Pulmonary infiltrates.  COMPARISON: 11/16/2023.  FINDINGS: Absence of intravenous contrast limits the sensitivity of this examination for detection of infectious/inflammatory change, post  traumatic abnormalities, vascular abnormalities, and visceral lesions.  LUNGS AND PLEURA: Trachea and large airways are patent. Mild diffuse bronchiectatic change and bronchial wall thickening. Persistent, though improving, multifocal and bilateral bronchiolitis, greatest within the right middle lobe. No large focal airspace   consolidation. Subpleural scarring within the right middle lobe is also unchanged.  A discretely suspicious pulmonary nodule is not identified, within the background of a diffuse bronchiolitis.  No pneumothorax.  No pleural effusion.  MEDIASTINUM/AXILLAE: No mediastinal lymphadenopathy is unchanged. For reference, a precarinal lymph node measures 10 mm in short axis, series 4 image 48, unchanged. No appreciable hilar lymphadenopathy, within limitation of the noncontrast technique.   Thoracic esophagus is unremarkable.    No axillary lymphadenopathy.  Chest wall is unremarkable  No thoracic aortic aneurysm. Mild atherosclerotic calcifications.  Heart is normal in size. No pericardial effusion.  CORONARY ARTERY CALCIFICATION: Mild.  UPPER ABDOMEN: No suspicious abnormality.  MUSCULOSKELETAL: No suspicious abnormality.  OTHER: No additionally suspicious abnormality.  IMPRESSION:  1.  Multifocal and bilateral bronchiolitis, improved from previous examination. Findings are suggestive of a chronic atypical mycobacterial infection, such as SOFYA.  2.  Mild mediastinal lymphadenopathy, unchanged and likely reactive.

## 2024-09-03 ENCOUNTER — HOSPITAL ENCOUNTER (OUTPATIENT)
Dept: NUCLEAR MEDICINE | Facility: HOSPITAL | Age: 69
Discharge: HOME | End: 2024-09-03
Attending: UROLOGY | Admitting: UROLOGY
Payer: COMMERCIAL

## 2024-09-03 DIAGNOSIS — R31.0 GROSS HEMATURIA: ICD-10-CM

## 2024-09-03 PROCEDURE — 250N000011 HC RX IP 250 OP 636: Performed by: UROLOGY

## 2024-09-03 PROCEDURE — 78708 K FLOW/FUNCT IMAGE W/DRUG: CPT

## 2024-09-03 PROCEDURE — A9562 TC99M MERTIATIDE: HCPCS | Performed by: UROLOGY

## 2024-09-03 PROCEDURE — 343N000001 HC RX 343: Performed by: UROLOGY

## 2024-09-03 RX ORDER — FUROSEMIDE 10 MG/ML
40 INJECTION INTRAMUSCULAR; INTRAVENOUS ONCE
Status: COMPLETED | OUTPATIENT
Start: 2024-09-03 | End: 2024-09-03

## 2024-09-03 RX ADMIN — FUROSEMIDE 40 MG: 10 INJECTION, SOLUTION INTRAMUSCULAR; INTRAVENOUS at 11:31

## 2024-09-03 RX ADMIN — TECHNESCAN TC 99M MERTIATIDE 8.1 MILLICURIE: 1 INJECTION, POWDER, LYOPHILIZED, FOR SOLUTION INTRAVENOUS at 11:45

## 2024-09-09 ENCOUNTER — HOSPITAL ENCOUNTER (EMERGENCY)
Facility: HOSPITAL | Age: 69
Discharge: HOME | End: 2024-09-09
Attending: EMERGENCY MEDICINE | Admitting: EMERGENCY MEDICINE
Payer: COMMERCIAL

## 2024-09-09 ENCOUNTER — APPOINTMENT (OUTPATIENT)
Dept: CT IMAGING | Facility: HOSPITAL | Age: 69
End: 2024-09-09
Attending: EMERGENCY MEDICINE
Payer: COMMERCIAL

## 2024-09-09 VITALS
RESPIRATION RATE: 18 BRPM | WEIGHT: 143 LBS | SYSTOLIC BLOOD PRESSURE: 118 MMHG | TEMPERATURE: 97.5 F | HEART RATE: 84 BPM | OXYGEN SATURATION: 97 % | BODY MASS INDEX: 23.8 KG/M2 | DIASTOLIC BLOOD PRESSURE: 58 MMHG

## 2024-09-09 DIAGNOSIS — S09.90XA MINOR HEAD INJURY, INITIAL ENCOUNTER: ICD-10-CM

## 2024-09-09 PROBLEM — Z79.01 ANTICOAGULATION MONITORING, INR RANGE 2-3: Status: ACTIVE | Noted: 2024-09-09

## 2024-09-09 PROCEDURE — 70450 CT HEAD/BRAIN W/O DYE: CPT

## 2024-09-09 PROCEDURE — 72125 CT NECK SPINE W/O DYE: CPT

## 2024-09-09 PROCEDURE — 99284 EMERGENCY DEPT VISIT MOD MDM: CPT | Mod: 25

## 2024-09-09 RX ORDER — ACETAMINOPHEN 325 MG/1
650 TABLET ORAL ONCE
Status: DISCONTINUED | OUTPATIENT
Start: 2024-09-09 | End: 2024-09-09 | Stop reason: HOSPADM

## 2024-09-09 ASSESSMENT — COLUMBIA-SUICIDE SEVERITY RATING SCALE - C-SSRS
2. HAVE YOU ACTUALLY HAD ANY THOUGHTS OF KILLING YOURSELF IN THE PAST MONTH?: NO
6. HAVE YOU EVER DONE ANYTHING, STARTED TO DO ANYTHING, OR PREPARED TO DO ANYTHING TO END YOUR LIFE?: NO
1. IN THE PAST MONTH, HAVE YOU WISHED YOU WERE DEAD OR WISHED YOU COULD GO TO SLEEP AND NOT WAKE UP?: NO

## 2024-09-09 ASSESSMENT — ACTIVITIES OF DAILY LIVING (ADL): ADLS_ACUITY_SCORE: 35

## 2024-09-09 NOTE — DISCHARGE INSTRUCTIONS
As we discussed, fortunately there are no signs of a broken neck, skull fracture, or bleeding in the brain.  It is safe to go home at this time.  You can take up to 8 extra strength Tylenol in a 24-hour..  If symptoms are not improving within 1 week, I recommend that you follow-up with your primary doctor and consider seeing a concussion specialist at that time.

## 2024-09-09 NOTE — ED PROVIDER NOTES
EMERGENCY DEPARTMENT ENCOUNTER     NAME: Joan Estevez   AGE: 69 year old female   YOB: 1955   MRN: [id number]   EVALUATION DATE & TIME: 9/9/2024  3:55 AM   PCP: Heaven Mccoy     Chief Complaint   Patient presents with    Headache   :    FINAL IMPRESSION       1. Minor head injury, initial encounter           ED COURSE & MEDICAL DECISION MAKING      Pertinent Labs & Imaging studies reviewed. (See chart for details)   69 year old female  presents to the Emergency Department for evaluation of residual intermittent headache ever since bumping her head on a tree limb approximately 4 to 5 days ago.  She has intermittently taken Tylenol.  There was no loss of consciousness but she is anticoagulated on Coumadin. Initial Vitals Reviewed. Initial exam notable for well-appearing patient who does have some left paraspinal cervical neck tenderness but no bony tenderness, GCS of 15 and an otherwise intact neurologic exam.  I did discuss with her that the 1 thing we would want to rule out is something like a subacute subdural hematoma and we decided to get CT of the head and I added cervical spine due to her tenderness even though I suspect it is more musculoskeletal as it is off of midline.  She took Tylenol prior to arrival, and using shared decision making decided against doing an IV headache cocktail as she is driving herself home with concerns for sedation.  CT of the head and cervical spine are fortunately negative reviewed and interpreted by me and confirmed by radiology.  She feels comfortable with discharge at this time.  We did discuss that this may be a mild concussive syndrome and discussed brain rest, Tylenol usage, and PCP follow-up for potential concussion referral if not improving.           At the conclusion of the encounter I discussed the results of all of the tests and the disposition. The questions were answered. The patient or family acknowledged understanding and was agreeable with  the care plan.     0 minutes critical care time, see procedure note below for details if relevant    Medical Decision Making    History:  Supplemental history from: N/A  External Record(s) reviewed: Documented in chart    Work Up:  Chart documentation includes differential considered and any EKGs or imaging independently interpreted by provider, where specified.  In additional to work up documented, I considered the following work up: Documented in chart, if applicable.    External consultation:  Discussion of management with another provider: Documented in chart, if applicable    Complicating factors:  Care impacted by chronic illness: Anticoagulated State  Care affected by social determinants of health: Access to Medical Care    Disposition considerations: Discharge. No recommendations on prescription strength medication(s). I considered admission, but ultimately discharged patient with reassuring imaging.    Adult Minor Head Trauma: Adult Minor Head Trauma: The patient is MODERATE of HIGH risk for traumatic brain jury, this Head CT was ordered based on the following risk factors: Age 65 years or older and Currently taking anticoagulant medications: warfarin or other novel anticoagulant medications            MEDICATIONS GIVEN IN THE EMERGENCY:   Medications   acetaminophen (TYLENOL) tablet 650 mg (has no administration in time range)      NEW PRESCRIPTIONS STARTED AT TODAY'S ER VISIT   New Prescriptions    No medications on file     ================================================================   HISTORY OF PRESENT ILLNESS       Patient information was obtained from: patient    Use of Intrepreter: N/A  Joan Estevez is a 69 year old female with history of atrial fibrillation on warfarin who presents for evaluation of headache.     The patient bumped her head on a tree branch last week. She has had a headache since that has been intermittently worse. Today she woke up with a worsening headache and was  concerned as she is on warfarin. She did not lose consciousness when she hit her head. She endorses left sided neck pain. She took tylenol at 2 AM with little relief.     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      PAST SURGICAL HISTORY:   Past Surgical History:   Procedure Laterality Date    CARDIAC SURGERY      cardioversion    COLONOSCOPY      GYN SURGERY      hyster abdomen    HYSTERECTOMY      OOPHORECTOMY      ORTHOPEDIC SURGERY      arthroscopy left    ORTHOPEDIC SURGERY      right foot neuroma      CURRENT MEDICATIONS:   acetaminophen (TYLENOL) 500 MG tablet  albuterol (PROAIR HFA/PROVENTIL HFA/VENTOLIN HFA) 108 (90 Base) MCG/ACT inhaler  diphenhydrAMINE (BENADRYL) 25 MG tablet  doxycycline hyclate (VIBRAMYCIN) 100 MG capsule  ibuprofen (ADVIL/MOTRIN) 200 MG tablet  ipratropium - albuterol 0.5 mg/2.5 mg/3 mL (DUONEB) 0.5-2.5 (3) MG/3ML neb solution  multivitamin, therapeutic with minerals (MULTI-VITAMIN) TABS  omeprazole (PRILOSEC OTC) 20 MG EC tablet  ondansetron (ZOFRAN ODT) 4 MG ODT tab  Sodium Chloride 3.5 % NEBU  warfarin ANTICOAGULANT (COUMADIN) 5 MG tablet      ALLERGIES:   Allergies   Allergen Reactions    Levofloxacin Rash    Sulfa Antibiotics Rash      FAMILY HISTORY:   Family History   Problem Relation Age of Onset    Cancer Mother     Breast Cancer Paternal Aunt         early 50    Hereditary Breast and Ovarian Cancer Syndrome No family hx of     Colon Cancer No family hx of     Endometrial Cancer No family hx of     Ovarian Cancer No family hx of       SOCIAL HISTORY:   Social History     Socioeconomic History    Marital status:    Tobacco Use    Smoking status: Never     Passive exposure:  Past (Both parents were smokers; did eventually quit)    Smokeless tobacco: Never   Vaping Use    Vaping status: Never Used   Substance and Sexual Activity    Alcohol use: No    Drug use: No     Social Determinants of Health     Financial Resource Strain: Low Risk  (3/18/2022)    Received from [facility]    Overall Financial Resource Strain (CARDIA)     Difficulty of Paying Living Expenses: Not hard at all   Food Insecurity: No Food Insecurity (3/18/2022)    Received from [facility]    Hunger Vital Sign     Worried About Running Out of Food in the Last Year: Never true     Ran Out of Food in the Last Year: Never true   Transportation Needs: No Transportation Needs (3/18/2022)    Received from [facility]    PRAPARE - Transportation     Lack of Transportation (Medical): No     Lack of Transportation (Non-Medical): No   Physical Activity: Sufficiently Active (3/18/2022)    Received from [facility]    Exercise Vital Sign     Days of Exercise per Week: 7 days     Minutes of Exercise per Session: 150+ min   Stress: No Stress Concern Present (3/18/2022)    Received from [facility]    [employer] - Occupational Stress Questionnaire     Feeling of Stress : Not at all   Social Connections: Moderately Integrated (3/18/2022)    Received from [facility]    Social Connection and Isolation Panel [NHANES]     Frequency of Communication with Friends and Family: More than three times a week     Frequency of Social Gatherings with Friends and Family: More than three times a week     Attends Tenriism Services: More than 4 times per year     Active Member of Clubs or Organizations: Yes     Attends Club or Organization Meetings: More than 4 times per year     Marital Status:    Interpersonal Safety: Not At Risk (3/18/2022)    Received from [facility]    Humiliation, Afraid, Rape, and Kick questionnaire     Fear of Current or Ex-Partner: No     Emotionally Abused: No     Physically Abused: No      Sexually Abused: No   Housing Stability: Low Risk  (3/18/2022)    Received from [facility]    Housing Stability Vital Sign     Unable to Pay for Housing in the Last Year: No     Number of Places Lived in the Last Year: 1     In the last 12 months, was there a time when you did not have a steady place to sleep or slept in a shelter (including now)?: No        VITALS  Patient Vitals for the past 24 hrs:   BP Temp Temp src Pulse Resp SpO2 Weight   09/09/24 0350 (!) 153/72 97.5  F (36.4  C) Oral 81 16 94 % 64.9 kg (143 lb)        ================================================================    PHYSICAL EXAM     VITAL SIGNS: BP (!) 153/72   Pulse 81   Temp 97.5  F (36.4  C) (Oral)   Resp 16   Wt 64.9 kg (143 lb)   SpO2 94%   BMI 23.80 kg/m     Constitutional:  Awake, no acute distress   HENT:  Atraumatic, oropharynx without exudate or erythema, membranes moist  Lymph:  No adenopathy  Eyes: EOM intact, PERRL, no injection  Neck: Supple  Respiratory:  Clear to auscultation bilaterally, no wheezes or crackles   Cardiovascular:  Regular rate and rhythm, single S1 and S2   GI:  Soft, nontender, nondistended, no rebound or guarding   Musculoskeletal:  Moves all extremities, no lower extremity edema, no deformities. Left perispinal cervical tenderness.   Skin:  Warm, dry  Neurologic:  Alert and oriented x3, no focal deficits noted, GCS 15          ================================================================  LAB       All pertinent labs reviewed and interpreted.   Labs Ordered and Resulted from Time of ED Arrival to Time of ED Departure - No data to display     ===============================================================  RADIOLOGY       Reviewed all pertinent imaging. Please see official radiology report.   CT Cervical Spine w/o Contrast   Final Result   IMPRESSION:   HEAD CT:   1.  No acute intracranial abnormality.      CERVICAL SPINE CT:   1.  No acute cervical spine fracture.                       Head CT  w/o contrast   Final Result   IMPRESSION:   HEAD CT:   1.  No acute intracranial abnormality.      CERVICAL SPINE CT:   1.  No acute cervical spine fracture.                             ================================================================  EKG         I have independently reviewed and interpreted the EKG(s) documented above.     ================================================================  PROCEDURES         I, Caroljeancarlos Hinojosa, am serving as a scribe to document services personally performed by Dr. Manriquez based on my observation and the provider's statements to me. I, Harleen Manriquez MD attest that Carol Hinojosa is acting in a scribe capacity, has observed my performance of the services and has documented them in accordance with my direction.   Harleen Manriquez M.D.   Emergency Medicine   [facility] [place] [place] EMERGENCY DEPARTMENT  [facility] [facility] [id number] [phone]  Dept: [phone]      Harleen Manriquez MD  09/09/24 0452

## 2024-09-09 NOTE — ED TRIAGE NOTES
The patient reports bumping her head on a low hanging limb 5 days ago. She did not lose consciousness and there was no bleeding. She is on warfarin. She reports that she has had a headache since. She reports that the headache is normally temporarily relieved when she sleeps. However, she woke this morning and it was still as bad as when she went to sleep.

## 2024-09-21 ENCOUNTER — HEALTH MAINTENANCE LETTER (OUTPATIENT)
Age: 69
End: 2024-09-21

## 2024-10-04 ENCOUNTER — TELEPHONE (OUTPATIENT)
Dept: PULMONOLOGY | Facility: CLINIC | Age: 69
End: 2024-10-04
Payer: COMMERCIAL

## 2024-10-04 DIAGNOSIS — J47.9 BRONCHIECTASIS WITHOUT COMPLICATION (H): ICD-10-CM

## 2024-10-04 RX ORDER — IPRATROPIUM BROMIDE AND ALBUTEROL SULFATE 2.5; .5 MG/3ML; MG/3ML
1 SOLUTION RESPIRATORY (INHALATION) EVERY 6 HOURS PRN
Qty: 270 ML | Refills: 4 | Status: SHIPPED | OUTPATIENT
Start: 2024-10-04

## 2024-10-04 NOTE — TELEPHONE ENCOUNTER
DATE: 10/04/2024  DME PROVIDER: Sudhakar   SUPPLY ORDERED: nebulizer supply  PROVIDER: Melinda Barrett    Faxed order and facesheet    Leona Weiss LPN

## 2024-10-04 NOTE — TELEPHONE ENCOUNTER
[facility] Call Center    Phone Message    May a detailed message be left on voicemail: yes     Reason for Call: Medication Refill Request    Has the patient contacted the pharmacy for the refill? Yes   Name of medication being requested: ipratropium - albuterol 0.5 mg/2.5 mg/3 mL (DUONEB) 0.5-2.5 (3) MG/3ML neb ,  she needs the attachments for her neb. Patient states it needs to be faxed to [phone].  Provider who prescribed the medication: Melinda Barrett APRN [facility]   Pharmacy:[facility] - [place], [place] - [address]   Date medication is needed: 10/04/24       Action Taken: Message routed to:  Other: pulm    Travel Screening: Not Applicable     Date of Service: 10/04/24

## 2024-10-23 ENCOUNTER — TELEPHONE (OUTPATIENT)
Dept: PULMONOLOGY | Facility: CLINIC | Age: 69
End: 2024-10-23
Payer: COMMERCIAL

## 2024-10-23 NOTE — TELEPHONE ENCOUNTER
[facility] Call Center    Phone Message    May a detailed message be left on voicemail: yes     Reason for Call: Medication Question or concern regarding medication   Prescription Clarification    Name of Medication:   Tubing for nebulizer    Prescribing Provider: Sonia     Pharmacy: [facility], Medical Supplies Phone:  [phone], Fax: [phone], Address: [address] [place]     What on the order needs clarification? Patient states she is needing more tubing for her nebulizer. Patient states she is needing a prescription to be sent the pharmacy listed above. Please advise.     Patient states the previous place she gotten the tubing from stated they do not have the tubing. Patient states she went on a wild goose chance to find a place the does carry the right tubing and found [facility]. Patient stated she wanted the clinic to be aware.       Action Taken: Message routed to:  [facility] Lung    Travel Screening: Not Applicable     Date of Service:

## 2024-10-23 NOTE — TELEPHONE ENCOUNTER
DATE: 10/23/2024  DME PROVIDER: [facility]  SUPPLY ORDERED: neb tubing   PROVIDER: Sonia    Faxed last note  Facesheet  Orders    Faxed to [facility]: [phone]    Leona Weiss LPN

## 2024-10-23 NOTE — TELEPHONE ENCOUNTER
Spoke with Joan. We originally sent the order for the tubing supplies to [facility] but apparently, her machine is a different one than [facility] carries. Will send order to [facility] as requested.

## 2024-11-22 ENCOUNTER — TELEPHONE (OUTPATIENT)
Dept: PULMONOLOGY | Facility: CLINIC | Age: 69
End: 2024-11-22
Payer: COMMERCIAL

## 2024-11-22 NOTE — TELEPHONE ENCOUNTER
[facility] Call Center    Phone Message    May a detailed message be left on voicemail: yes     Reason for Call: Order(s): Other:   Reason for requested:  Pt called, she is requesting orders for neb tubing enough for 6 months (insurance allowed, per pt). She would like orders sent to [facility] in [facility]. She states, per [facility] they don't have the right orders for tubing at this time.     [facility]:  Fax #: [phone] attn: Cori.  Phone #: [phone]      Date needed: asap, pt has been trying to get tubing for 2 months now.  Provider name: Sonia Arredondo    Action Taken: Other: PULM    Travel Screening: Not Applicable     Date of Service:

## 2024-11-25 ENCOUNTER — TELEPHONE (OUTPATIENT)
Dept: PULMONOLOGY | Facility: CLINIC | Age: 69
End: 2024-11-25
Payer: COMMERCIAL

## 2024-11-25 NOTE — TELEPHONE ENCOUNTER
Called and spoke with Joan. Informed her the orders where sent to [facility] per her request.

## 2024-11-25 NOTE — TELEPHONE ENCOUNTER
DATE: 11/25/2024  DME PROVIDER: Juan A   SUPPLY ORDERED: neb tubing and supply  PROVIDER: Nena    Faxed    Last note  Facesheet  Order    Leona Weiss LPN

## 2024-12-30 ENCOUNTER — TELEPHONE (OUTPATIENT)
Dept: PULMONOLOGY | Facility: CLINIC | Age: 69
End: 2024-12-30
Payer: COMMERCIAL

## 2024-12-30 DIAGNOSIS — J47.9 BRONCHIECTASIS WITHOUT COMPLICATION (H): Primary | ICD-10-CM

## 2024-12-30 DIAGNOSIS — R05.9 COUGH: ICD-10-CM

## 2024-12-30 NOTE — TELEPHONE ENCOUNTER
Patient states she spoke with [facility] medical equipment line and they advised her she would need to see the provider before getting any equipment replaced. Patient requesting an urgent call back from the care team to advise how to proceed. No opening with provider for about a month.

## 2024-12-30 NOTE — TELEPHONE ENCOUNTER
[facility] Call Center    Phone Message    May a detailed message be left on voicemail: yes     Reason for Call: Order(s): Other:   Reason for requested: Patients neb broke and would like a order for new machine and tubing sent to [facility] medical equipment [facility] - number [phone]   Date needed: 12/30/24  Provider name: Sonia Arredondo    Action Taken: Message routed to:  Other: pulm    Travel Screening: Not Applicable     Date of Service: 12/30/24

## 2024-12-31 NOTE — TELEPHONE ENCOUNTER
Spoke with Joan. She needs new neb machine. Her is 6 and a half years old. She would like this to be sent to [facility] this time. Will order neb and supplies for our doc of the day, Dr. Solo to sign.

## 2025-01-02 ENCOUNTER — TELEPHONE (OUTPATIENT)
Dept: PULMONOLOGY | Facility: CLINIC | Age: 70
End: 2025-01-02
Payer: COMMERCIAL

## 2025-01-02 NOTE — TELEPHONE ENCOUNTER
DATE: 01/02/2025  DME PROVIDER: Sudhakar   SUPPLY ORDERED: nebulizer with cup/tubing   PROVIDER: Edil    Faxed order    Leona Weiss LPN

## 2025-02-20 ENCOUNTER — ANCILLARY PROCEDURE (OUTPATIENT)
Dept: MAMMOGRAPHY | Facility: CLINIC | Age: 70
End: 2025-02-20
Attending: FAMILY MEDICINE
Payer: COMMERCIAL

## 2025-02-20 DIAGNOSIS — Z12.31 VISIT FOR SCREENING MAMMOGRAM: ICD-10-CM

## 2025-02-20 PROCEDURE — 77063 BREAST TOMOSYNTHESIS BI: CPT

## 2025-03-04 ENCOUNTER — APPOINTMENT (OUTPATIENT)
Dept: MRI IMAGING | Facility: HOSPITAL | Age: 70
End: 2025-03-04
Attending: STUDENT IN AN ORGANIZED HEALTH CARE EDUCATION/TRAINING PROGRAM
Payer: COMMERCIAL

## 2025-03-04 ENCOUNTER — APPOINTMENT (OUTPATIENT)
Dept: CT IMAGING | Facility: HOSPITAL | Age: 70
End: 2025-03-04
Attending: STUDENT IN AN ORGANIZED HEALTH CARE EDUCATION/TRAINING PROGRAM
Payer: COMMERCIAL

## 2025-03-04 ENCOUNTER — HOSPITAL ENCOUNTER (EMERGENCY)
Facility: HOSPITAL | Age: 70
Discharge: HOME | End: 2025-03-04
Attending: STUDENT IN AN ORGANIZED HEALTH CARE EDUCATION/TRAINING PROGRAM | Admitting: STUDENT IN AN ORGANIZED HEALTH CARE EDUCATION/TRAINING PROGRAM
Payer: COMMERCIAL

## 2025-03-04 VITALS
BODY MASS INDEX: 23.3 KG/M2 | RESPIRATION RATE: 18 BRPM | SYSTOLIC BLOOD PRESSURE: 133 MMHG | OXYGEN SATURATION: 97 % | HEART RATE: 71 BPM | WEIGHT: 140 LBS | TEMPERATURE: 98.3 F | DIASTOLIC BLOOD PRESSURE: 64 MMHG

## 2025-03-04 DIAGNOSIS — R42 VERTIGO: ICD-10-CM

## 2025-03-04 LAB
ANION GAP SERPL CALCULATED.3IONS-SCNC: 5 MMOL/L (ref 7–15)
BASOPHILS # BLD AUTO: 0.1 10E3/UL (ref 0–0.2)
BASOPHILS NFR BLD AUTO: 1 %
BUN SERPL-MCNC: 12 MG/DL (ref 8–23)
CALCIUM SERPL-MCNC: 9.3 MG/DL (ref 8.8–10.4)
CHLORIDE SERPL-SCNC: 108 MMOL/L (ref 98–107)
CREAT SERPL-MCNC: 0.66 MG/DL (ref 0.51–0.95)
EGFRCR SERPLBLD CKD-EPI 2021: >90 ML/MIN/1.73M2
EOSINOPHIL # BLD AUTO: 0.1 10E3/UL (ref 0–0.7)
EOSINOPHIL NFR BLD AUTO: 1 %
ERYTHROCYTE [DISTWIDTH] IN BLOOD BY AUTOMATED COUNT: 12.7 % (ref 10–15)
GLUCOSE SERPL-MCNC: 99 MG/DL (ref 70–99)
HCO3 SERPL-SCNC: 28 MMOL/L (ref 22–29)
HCT VFR BLD AUTO: 40.7 % (ref 35–47)
HGB BLD-MCNC: 13.6 G/DL (ref 11.7–15.7)
IMM GRANULOCYTES # BLD: 0 10E3/UL
IMM GRANULOCYTES NFR BLD: 0 %
INR PPP: 2.76 (ref 0.85–1.15)
LYMPHOCYTES # BLD AUTO: 2.8 10E3/UL (ref 0.8–5.3)
LYMPHOCYTES NFR BLD AUTO: 34 %
MCH RBC QN AUTO: 28.8 PG (ref 26.5–33)
MCHC RBC AUTO-ENTMCNC: 33.4 G/DL (ref 31.5–36.5)
MCV RBC AUTO: 86 FL (ref 78–100)
MONOCYTES # BLD AUTO: 0.4 10E3/UL (ref 0–1.3)
MONOCYTES NFR BLD AUTO: 5 %
NEUTROPHILS # BLD AUTO: 4.9 10E3/UL (ref 1.6–8.3)
NEUTROPHILS NFR BLD AUTO: 59 %
NRBC # BLD AUTO: 0 10E3/UL
NRBC BLD AUTO-RTO: 0 /100
PLATELET # BLD AUTO: 229 10E3/UL (ref 150–450)
POTASSIUM SERPL-SCNC: 3.8 MMOL/L (ref 3.4–5.3)
RBC # BLD AUTO: 4.72 10E6/UL (ref 3.8–5.2)
SODIUM SERPL-SCNC: 141 MMOL/L (ref 135–145)
WBC # BLD AUTO: 8.4 10E3/UL (ref 4–11)

## 2025-03-04 PROCEDURE — 99285 EMERGENCY DEPT VISIT HI MDM: CPT | Mod: 25

## 2025-03-04 PROCEDURE — 85004 AUTOMATED DIFF WBC COUNT: CPT | Performed by: STUDENT IN AN ORGANIZED HEALTH CARE EDUCATION/TRAINING PROGRAM

## 2025-03-04 PROCEDURE — 85610 PROTHROMBIN TIME: CPT | Performed by: STUDENT IN AN ORGANIZED HEALTH CARE EDUCATION/TRAINING PROGRAM

## 2025-03-04 PROCEDURE — 70553 MRI BRAIN STEM W/O & W/DYE: CPT

## 2025-03-04 PROCEDURE — 250N000013 HC RX MED GY IP 250 OP 250 PS 637: Performed by: STUDENT IN AN ORGANIZED HEALTH CARE EDUCATION/TRAINING PROGRAM

## 2025-03-04 PROCEDURE — 70450 CT HEAD/BRAIN W/O DYE: CPT

## 2025-03-04 PROCEDURE — 255N000002 HC RX 255 OP 636: Performed by: STUDENT IN AN ORGANIZED HEALTH CARE EDUCATION/TRAINING PROGRAM

## 2025-03-04 PROCEDURE — 36415 COLL VENOUS BLD VENIPUNCTURE: CPT | Performed by: STUDENT IN AN ORGANIZED HEALTH CARE EDUCATION/TRAINING PROGRAM

## 2025-03-04 PROCEDURE — 85048 AUTOMATED LEUKOCYTE COUNT: CPT | Performed by: STUDENT IN AN ORGANIZED HEALTH CARE EDUCATION/TRAINING PROGRAM

## 2025-03-04 PROCEDURE — 82374 ASSAY BLOOD CARBON DIOXIDE: CPT | Performed by: STUDENT IN AN ORGANIZED HEALTH CARE EDUCATION/TRAINING PROGRAM

## 2025-03-04 PROCEDURE — 82565 ASSAY OF CREATININE: CPT | Performed by: STUDENT IN AN ORGANIZED HEALTH CARE EDUCATION/TRAINING PROGRAM

## 2025-03-04 PROCEDURE — 80048 BASIC METABOLIC PNL TOTAL CA: CPT | Performed by: STUDENT IN AN ORGANIZED HEALTH CARE EDUCATION/TRAINING PROGRAM

## 2025-03-04 PROCEDURE — A9585 GADOBUTROL INJECTION: HCPCS | Performed by: STUDENT IN AN ORGANIZED HEALTH CARE EDUCATION/TRAINING PROGRAM

## 2025-03-04 RX ORDER — GADOBUTROL 604.72 MG/ML
6 INJECTION INTRAVENOUS ONCE
Status: COMPLETED | OUTPATIENT
Start: 2025-03-04 | End: 2025-03-04

## 2025-03-04 RX ORDER — MECLIZINE HYDROCHLORIDE 25 MG/1
25 TABLET ORAL 3 TIMES DAILY PRN
Qty: 21 TABLET | Refills: 0 | Status: SHIPPED | OUTPATIENT
Start: 2025-03-04 | End: 2025-03-11

## 2025-03-04 RX ORDER — MECLIZINE HYDROCHLORIDE 25 MG/1
25 TABLET ORAL ONCE
Status: COMPLETED | OUTPATIENT
Start: 2025-03-04 | End: 2025-03-04

## 2025-03-04 RX ADMIN — MECLIZINE HYDROCHLORIDE 25 MG: 25 TABLET ORAL at 05:19

## 2025-03-04 RX ADMIN — GADOBUTROL 6 ML: 604.72 INJECTION INTRAVENOUS at 10:19

## 2025-03-04 ASSESSMENT — ACTIVITIES OF DAILY LIVING (ADL)
ADLS_ACUITY_SCORE: 41

## 2025-03-04 ASSESSMENT — COLUMBIA-SUICIDE SEVERITY RATING SCALE - C-SSRS
2. HAVE YOU ACTUALLY HAD ANY THOUGHTS OF KILLING YOURSELF IN THE PAST MONTH?: NO
1. IN THE PAST MONTH, HAVE YOU WISHED YOU WERE DEAD OR WISHED YOU COULD GO TO SLEEP AND NOT WAKE UP?: NO
6. HAVE YOU EVER DONE ANYTHING, STARTED TO DO ANYTHING, OR PREPARED TO DO ANYTHING TO END YOUR LIFE?: NO

## 2025-03-04 NOTE — DISCHARGE INSTRUCTIONS
Please return to the emergency department if your symptoms worsen, if you develop numbness/weakness on one side of your body, loss of vision, worsening headache that does not improve with over-the-counter medications, fever with neck stiffness.

## 2025-03-04 NOTE — ED TRIAGE NOTES
"She started to have dizziness yesterday. She takes blood thinners for afib. She is concerned about her INR \"going up and down.\" She feels the dizziness is better now after sleeping. She does not appear to have any difficulty walking. Denies unilateral weakness. Does state having blurry vision in both eyes. She also has a headache since yesterday. Her last known well time is 1700 yesterday or twelve hours ago.         "

## 2025-03-04 NOTE — ED NOTES
"MRI checklist noted to be dated/initialled \"CG\" and on pts chart, reporting nurse states checklist has been faxed.  "

## 2025-03-04 NOTE — ED PROVIDER NOTES
EMERGENCY DEPARTMENT SIGNOUT NOTE    Patient signed out to me from Dr. Stoddard.      Joan Estevez is a 70 year old female noted to be pending MRI       RADIOLOGY/LABS:  Reviewed all pertinent imaging. Please see official radiology report. All pertinent labs reviewed and interpreted.    Results for orders placed or performed during the hospital encounter of 03/04/25   CT Head w/o Contrast    Impression    IMPRESSION:  1.  No acute intracranial process.   MR Brain w/o & w Contrast    Impression    IMPRESSION:  1.  No acute/subacute infarct or other acute intracranial abnormality.   Basic metabolic panel   Result Value Ref Range    Sodium 141 135 - 145 mmol/L    Potassium 3.8 3.4 - 5.3 mmol/L    Chloride 108 (H) 98 - 107 mmol/L    Carbon Dioxide (CO2) 28 22 - 29 mmol/L    Anion Gap 5 (L) 7 - 15 mmol/L    Urea Nitrogen 12.0 8.0 - 23.0 mg/dL    Creatinine 0.66 0.51 - 0.95 mg/dL    GFR Estimate >90 >60 mL/min/1.73m2    Calcium 9.3 8.8 - 10.4 mg/dL    Glucose 99 70 - 99 mg/dL   Result Value Ref Range    INR 2.76 (H) 0.85 - 1.15   CBC with platelets and differential   Result Value Ref Range    WBC Count 8.4 4.0 - 11.0 10e3/uL    RBC Count 4.72 3.80 - 5.20 10e6/uL    Hemoglobin 13.6 11.7 - 15.7 g/dL    Hematocrit 40.7 35.0 - 47.0 %    MCV 86 78 - 100 fL    MCH 28.8 26.5 - 33.0 pg    MCHC 33.4 31.5 - 36.5 g/dL    RDW 12.7 10.0 - 15.0 %    Platelet Count 229 150 - 450 10e3/uL    % Neutrophils 59 %    % Lymphocytes 34 %    % Monocytes 5 %    % Eosinophils 1 %    % Basophils 1 %    % Immature Granulocytes 0 %    NRBCs per 100 WBC 0 <1 /100    Absolute Neutrophils 4.9 1.6 - 8.3 10e3/uL    Absolute Lymphocytes 2.8 0.8 - 5.3 10e3/uL    Absolute Monocytes 0.4 0.0 - 1.3 10e3/uL    Absolute Eosinophils 0.1 0.0 - 0.7 10e3/uL    Absolute Basophils 0.1 0.0 - 0.2 10e3/uL    Absolute Immature Granulocytes 0.0 <=0.4 10e3/uL    Absolute NRBCs 0.0 10e3/uL             ED COURSE :  Pertinent Labs & Imaging studies reviewed. (See chart  for details)  70 year old female     ED Course as of 03/04/25 1043   Tue Mar 04, 2025   0515 Patient is a 70-year-old female who is anticoagulated on warfarin due to history of atrial fibrillation status post ablation, and presents to the emergency department with constant vertigo, headache that began approximately 12 hours prior to arrival.  Symptoms have improved a little after taking Tylenol and sleeping part of the night, but still persistent.  She denies any visual changes other than vertigo.  Denies numbness/weakness on one side of her body.  Recently she did hit her head on a tree branch, but not hard enough that caused loss of consciousness or made her feel like she should be evaluated.  She also recently had a reduction in her warfarin dose.  No recent changes in hearing or tinnitus.  Her neuroexam is unremarkable.  She has horizontal beating nystagmus.  Given her age, persistent symptoms, cannot exclude central cause of vertigo without further imaging.  Will start with CT head without contrast to evaluate for bleeding given her recent head trauma and being on warfarin.  Then will do MRI to evaluate for ischemic stroke.  In the meantime, we will check her INR and provide meclizine.   0536 INR is within goal, at 2.76.  No leukocytosis or anemia.   0607 CT head negative for acute intracranial pathology, specifically no bleeding.   1043 MRI unremarkable.  Patient feels improved.  Discharged home as planned.       FINAL IMPRESSION:  1. Vertigo      Anamika Hood MD  Emergency Medicine  [facility] EMERGENCY DEPARTMENT  [address] [place] [id number]  [phone]  Dept: [phone]     Anamika Hood MD  03/04/25 1043

## 2025-03-09 ENCOUNTER — HEALTH MAINTENANCE LETTER (OUTPATIENT)
Age: 70
End: 2025-03-09

## 2025-04-03 ENCOUNTER — LAB REQUISITION (OUTPATIENT)
Dept: LAB | Facility: CLINIC | Age: 70
End: 2025-04-03
Payer: COMMERCIAL

## 2025-04-03 DIAGNOSIS — E78.2 MIXED HYPERLIPIDEMIA: ICD-10-CM

## 2025-04-03 LAB
ALBUMIN SERPL BCG-MCNC: 4.1 G/DL (ref 3.5–5.2)
ALP SERPL-CCNC: 106 U/L (ref 40–150)
ALT SERPL W P-5'-P-CCNC: 15 U/L (ref 0–50)
ANION GAP SERPL CALCULATED.3IONS-SCNC: 13 MMOL/L (ref 7–15)
AST SERPL W P-5'-P-CCNC: 24 U/L (ref 0–45)
BILIRUB SERPL-MCNC: 0.4 MG/DL
BUN SERPL-MCNC: 15 MG/DL (ref 8–23)
CALCIUM SERPL-MCNC: 9.4 MG/DL (ref 8.8–10.4)
CHLORIDE SERPL-SCNC: 104 MMOL/L (ref 98–107)
CHOLEST SERPL-MCNC: 155 MG/DL
CREAT SERPL-MCNC: 0.66 MG/DL (ref 0.51–0.95)
EGFRCR SERPLBLD CKD-EPI 2021: >90 ML/MIN/1.73M2
FASTING STATUS PATIENT QL REPORTED: ABNORMAL
FASTING STATUS PATIENT QL REPORTED: NORMAL
GLUCOSE SERPL-MCNC: 94 MG/DL (ref 70–99)
HCO3 SERPL-SCNC: 24 MMOL/L (ref 22–29)
HDLC SERPL-MCNC: 46 MG/DL
LDLC SERPL CALC-MCNC: 93 MG/DL
NONHDLC SERPL-MCNC: 109 MG/DL
POTASSIUM SERPL-SCNC: 4.1 MMOL/L (ref 3.4–5.3)
PROT SERPL-MCNC: 6.9 G/DL (ref 6.4–8.3)
SODIUM SERPL-SCNC: 141 MMOL/L (ref 135–145)
TRIGL SERPL-MCNC: 79 MG/DL

## 2025-04-03 PROCEDURE — 80061 LIPID PANEL: CPT | Mod: ORL | Performed by: FAMILY MEDICINE

## 2025-04-03 PROCEDURE — 80053 COMPREHEN METABOLIC PANEL: CPT | Mod: ORL | Performed by: FAMILY MEDICINE

## 2025-05-27 DIAGNOSIS — J47.9 BRONCHIECTASIS WITHOUT COMPLICATION (H): ICD-10-CM

## 2025-05-27 RX ORDER — IPRATROPIUM BROMIDE AND ALBUTEROL SULFATE 2.5; .5 MG/3ML; MG/3ML
SOLUTION RESPIRATORY (INHALATION)
Qty: 270 ML | Refills: 7 | Status: SHIPPED | OUTPATIENT
Start: 2025-05-27

## 2025-06-24 DIAGNOSIS — J47.9 BRONCHIECTASIS WITHOUT COMPLICATION (H): Primary | ICD-10-CM

## (undated) RX ORDER — FUROSEMIDE 10 MG/ML
INJECTION INTRAMUSCULAR; INTRAVENOUS
Status: DISPENSED
Start: 2024-09-03

## (undated) RX ORDER — ALBUTEROL SULFATE 0.83 MG/ML
SOLUTION RESPIRATORY (INHALATION)
Status: DISPENSED
Start: 2023-11-20

## (undated) RX ORDER — SODIUM CHLORIDE FOR INHALATION 10 %
VIAL, NEBULIZER (ML) INHALATION
Status: DISPENSED
Start: 2023-11-20

## (undated) RX ORDER — FUROSEMIDE 10 MG/ML
INJECTION INTRAMUSCULAR; INTRAVENOUS
Status: DISPENSED
Start: 2024-02-26